# Patient Record
Sex: MALE | Race: WHITE | NOT HISPANIC OR LATINO | Employment: OTHER | URBAN - METROPOLITAN AREA
[De-identification: names, ages, dates, MRNs, and addresses within clinical notes are randomized per-mention and may not be internally consistent; named-entity substitution may affect disease eponyms.]

---

## 2023-02-16 PROBLEM — R26.9 GAIT DISTURBANCE: Status: ACTIVE | Noted: 2023-02-16

## 2023-02-16 PROBLEM — G70.00 MYASTHENIA GRAVIS (MULTI): Status: ACTIVE | Noted: 2023-02-16

## 2023-02-16 PROBLEM — R93.5 ABNORMAL CT OF THE ABDOMEN: Status: ACTIVE | Noted: 2023-02-16

## 2023-02-16 PROBLEM — I10 HTN (HYPERTENSION): Status: ACTIVE | Noted: 2023-02-16

## 2023-02-16 PROBLEM — Z98.61 CAD S/P PERCUTANEOUS CORONARY ANGIOPLASTY: Status: ACTIVE | Noted: 2023-02-16

## 2023-02-16 PROBLEM — R10.13 ABDOMINAL PAIN, EPIGASTRIC: Status: ACTIVE | Noted: 2023-02-16

## 2023-02-16 PROBLEM — N50.819 PERSISTENT TESTICULAR PAIN: Status: ACTIVE | Noted: 2023-02-16

## 2023-02-16 PROBLEM — E61.1 LOW IRON: Status: ACTIVE | Noted: 2023-02-16

## 2023-02-16 PROBLEM — I25.2 HISTORY OF ST ELEVATION MYOCARDIAL INFARCTION (STEMI): Status: ACTIVE | Noted: 2023-02-16

## 2023-02-16 PROBLEM — B35.3 TINEA PEDIS, RECURRENT: Status: ACTIVE | Noted: 2023-02-16

## 2023-02-16 PROBLEM — K21.9 GERD (GASTROESOPHAGEAL REFLUX DISEASE): Status: ACTIVE | Noted: 2023-02-16

## 2023-02-16 PROBLEM — G43.909 MIGRAINE: Status: ACTIVE | Noted: 2023-02-16

## 2023-02-16 PROBLEM — D51.9 B12 DEFICIENCY ANEMIA: Status: ACTIVE | Noted: 2023-02-16

## 2023-02-16 PROBLEM — R42 VERTIGO: Status: ACTIVE | Noted: 2023-02-16

## 2023-02-16 PROBLEM — N20.0 CALCIUM NEPHROLITHIASIS: Status: ACTIVE | Noted: 2023-02-16

## 2023-02-16 PROBLEM — M54.2 CERVICALGIA: Status: ACTIVE | Noted: 2023-02-16

## 2023-02-16 PROBLEM — E78.5 HYPERLIPEMIA: Status: ACTIVE | Noted: 2023-02-16

## 2023-02-16 PROBLEM — B00.1 COLD SORE: Status: ACTIVE | Noted: 2023-02-16

## 2023-02-16 PROBLEM — L72.3 SEBACEOUS CYST: Status: ACTIVE | Noted: 2023-02-16

## 2023-02-16 PROBLEM — R59.9 SWOLLEN GLAND: Status: ACTIVE | Noted: 2023-02-16

## 2023-02-16 PROBLEM — I21.9 HEART ATTACK (MULTI): Status: ACTIVE | Noted: 2023-02-16

## 2023-02-16 PROBLEM — R53.83 FATIGUE: Status: ACTIVE | Noted: 2023-02-16

## 2023-02-16 PROBLEM — B07.0 BILATERAL PLANTAR WART: Status: ACTIVE | Noted: 2023-02-16

## 2023-02-16 PROBLEM — N40.0 BPH (BENIGN PROSTATIC HYPERPLASIA): Status: ACTIVE | Noted: 2023-02-16

## 2023-02-16 PROBLEM — I25.10 CAD S/P PERCUTANEOUS CORONARY ANGIOPLASTY: Status: ACTIVE | Noted: 2023-02-16

## 2023-02-16 PROBLEM — I48.91 ATRIAL FIBRILLATION (MULTI): Status: ACTIVE | Noted: 2023-02-16

## 2023-02-16 RX ORDER — AZATHIOPRINE 50 MG/1
2 TABLET ORAL 2 TIMES DAILY
COMMUNITY
Start: 2019-09-10

## 2023-02-16 RX ORDER — KETOCONAZOLE 20 MG/G
1 CREAM TOPICAL 2 TIMES DAILY
COMMUNITY
Start: 2021-08-18

## 2023-02-16 RX ORDER — CHOLECALCIFEROL (VITAMIN D3) 50 MCG
1 TABLET ORAL DAILY
COMMUNITY
Start: 2019-09-10

## 2023-03-29 ENCOUNTER — OFFICE VISIT (OUTPATIENT)
Dept: PRIMARY CARE | Facility: CLINIC | Age: 78
End: 2023-03-29
Payer: MEDICARE

## 2023-03-29 ENCOUNTER — APPOINTMENT (OUTPATIENT)
Dept: PRIMARY CARE | Facility: CLINIC | Age: 78
End: 2023-03-29
Payer: MEDICARE

## 2023-03-29 VITALS
BODY MASS INDEX: 25.77 KG/M2 | OXYGEN SATURATION: 97 % | WEIGHT: 180 LBS | HEIGHT: 70 IN | TEMPERATURE: 97.5 F | HEART RATE: 62 BPM | RESPIRATION RATE: 14 BRPM | DIASTOLIC BLOOD PRESSURE: 70 MMHG | SYSTOLIC BLOOD PRESSURE: 120 MMHG

## 2023-03-29 DIAGNOSIS — Z98.61 CAD S/P PERCUTANEOUS CORONARY ANGIOPLASTY: ICD-10-CM

## 2023-03-29 DIAGNOSIS — I25.10 CAD S/P PERCUTANEOUS CORONARY ANGIOPLASTY: ICD-10-CM

## 2023-03-29 DIAGNOSIS — G70.00 MYASTHENIA GRAVIS (MULTI): ICD-10-CM

## 2023-03-29 DIAGNOSIS — I10 PRIMARY HYPERTENSION: ICD-10-CM

## 2023-03-29 DIAGNOSIS — E78.5 HYPERLIPIDEMIA, UNSPECIFIED HYPERLIPIDEMIA TYPE: ICD-10-CM

## 2023-03-29 DIAGNOSIS — D51.8 OTHER VITAMIN B12 DEFICIENCY ANEMIA: Primary | ICD-10-CM

## 2023-03-29 DIAGNOSIS — I48.0 PAROXYSMAL ATRIAL FIBRILLATION (MULTI): ICD-10-CM

## 2023-03-29 PROBLEM — R42 VERTIGO: Status: RESOLVED | Noted: 2023-02-16 | Resolved: 2023-03-29

## 2023-03-29 PROBLEM — R59.9 SWOLLEN GLAND: Status: RESOLVED | Noted: 2023-02-16 | Resolved: 2023-03-29

## 2023-03-29 PROBLEM — B00.1 COLD SORE: Status: RESOLVED | Noted: 2023-02-16 | Resolved: 2023-03-29

## 2023-03-29 PROBLEM — L72.3 SEBACEOUS CYST: Status: RESOLVED | Noted: 2023-02-16 | Resolved: 2023-03-29

## 2023-03-29 PROCEDURE — 1160F RVW MEDS BY RX/DR IN RCRD: CPT | Performed by: INTERNAL MEDICINE

## 2023-03-29 PROCEDURE — 99213 OFFICE O/P EST LOW 20 MIN: CPT | Performed by: INTERNAL MEDICINE

## 2023-03-29 PROCEDURE — 96372 THER/PROPH/DIAG INJ SC/IM: CPT | Performed by: INTERNAL MEDICINE

## 2023-03-29 PROCEDURE — 3078F DIAST BP <80 MM HG: CPT | Performed by: INTERNAL MEDICINE

## 2023-03-29 PROCEDURE — 1036F TOBACCO NON-USER: CPT | Performed by: INTERNAL MEDICINE

## 2023-03-29 PROCEDURE — 1157F ADVNC CARE PLAN IN RCRD: CPT | Performed by: INTERNAL MEDICINE

## 2023-03-29 PROCEDURE — 1159F MED LIST DOCD IN RCRD: CPT | Performed by: INTERNAL MEDICINE

## 2023-03-29 PROCEDURE — 3074F SYST BP LT 130 MM HG: CPT | Performed by: INTERNAL MEDICINE

## 2023-03-29 RX ORDER — PYRIDOSTIGMINE BROMIDE 60 MG/1
60 TABLET ORAL 3 TIMES DAILY
COMMUNITY
End: 2024-04-09 | Stop reason: ALTCHOICE

## 2023-03-29 RX ORDER — CYANOCOBALAMIN 1000 UG/ML
1000 INJECTION, SOLUTION INTRAMUSCULAR; SUBCUTANEOUS ONCE
Status: COMPLETED | OUTPATIENT
Start: 2023-03-29 | End: 2023-03-29

## 2023-03-29 RX ADMIN — CYANOCOBALAMIN 1000 MCG: 1000 INJECTION, SOLUTION INTRAMUSCULAR; SUBCUTANEOUS at 16:57

## 2023-03-29 ASSESSMENT — ENCOUNTER SYMPTOMS
COUGH: 0
PALPITATIONS: 0
WHEEZING: 0
SHORTNESS OF BREATH: 0
RHINORRHEA: 1
ABDOMINAL PAIN: 0
CONSTIPATION: 0

## 2023-03-29 NOTE — PROGRESS NOTES
"Subjective   Patient ID: David Caldwell is a 77 y.o. male who presents for Hyperlipidemia (Follow up  and  left eye changes .).  He has been on contact with his Myasthenia gravis doctor and his medications have been increased/changed.    Other than the Myasthenia symptoms he has been feeling well.  He continues to work out regularly.  He denies any issues with CP, SOB or dizzy spells.    Hyperlipidemia  Pertinent negatives include no shortness of breath.        Review of Systems   HENT:  Positive for rhinorrhea.    Respiratory:  Negative for cough, shortness of breath and wheezing.    Cardiovascular:  Negative for palpitations.   Gastrointestinal:  Negative for abdominal pain and constipation.       Objective   /70 (BP Location: Left arm, Patient Position: Sitting, BP Cuff Size: Adult)   Pulse 62   Temp 36.4 °C (97.5 °F) (Tympanic)   Resp 14   Ht 1.778 m (5' 10\")   Wt 81.6 kg (180 lb)   SpO2 97%   BMI 25.83 kg/m²     Physical Exam  Constitutional:       General: He is not in acute distress.     Appearance: Normal appearance. He is not ill-appearing.   HENT:      Head: Normocephalic and atraumatic.      Nose: Nose normal.   Eyes:      Extraocular Movements: Extraocular movements intact.      Conjunctiva/sclera: Conjunctivae normal.      Pupils: Pupils are equal, round, and reactive to light.   Cardiovascular:      Rate and Rhythm: Normal rate and regular rhythm.      Pulses: Normal pulses.      Heart sounds: Normal heart sounds.   Pulmonary:      Effort: Pulmonary effort is normal.      Breath sounds: Normal breath sounds.   Abdominal:      General: There is no distension.   Musculoskeletal:         General: Normal range of motion.      Cervical back: Neck supple.   Neurological:      General: No focal deficit present.      Mental Status: He is alert.      Gait: Gait normal.   Psychiatric:         Mood and Affect: Mood normal.         Behavior: Behavior normal.         Assessment/Plan   Problem List " Items Addressed This Visit          Nervous    Myasthenia gravis (CMS/HCC)       Circulatory    Atrial fibrillation (CMS/HCC)    Relevant Orders    CBC and Auto Differential    Thyroid Stimulating Hormone    CAD S/P percutaneous coronary angioplasty    Relevant Orders    Lipid Panel    HTN (hypertension)    Relevant Orders    Comprehensive Metabolic Panel       Endocrine/Metabolic    B12 deficiency anemia - Primary    Relevant Medications    cyanocobalamin (Vitamin B-12) injection 1,000 mcg (Completed)       Other    Hyperlipemia   Cardiac status and risk factor seem to be well controlled.  We will check cholesterol level and metabolic risk.  Currently no changes in medications are needed.    Due to his increase in his MG medications we will check his blood counts as well.    Follow up in 6 months - sooner if any issues.

## 2023-04-11 ENCOUNTER — LAB (OUTPATIENT)
Dept: LAB | Facility: LAB | Age: 78
End: 2023-04-11
Payer: MEDICARE

## 2023-04-11 DIAGNOSIS — I25.10 CAD S/P PERCUTANEOUS CORONARY ANGIOPLASTY: ICD-10-CM

## 2023-04-11 DIAGNOSIS — Z98.61 CAD S/P PERCUTANEOUS CORONARY ANGIOPLASTY: ICD-10-CM

## 2023-04-11 DIAGNOSIS — I10 PRIMARY HYPERTENSION: ICD-10-CM

## 2023-04-11 DIAGNOSIS — I48.0 PAROXYSMAL ATRIAL FIBRILLATION (MULTI): ICD-10-CM

## 2023-04-11 LAB
ALANINE AMINOTRANSFERASE (SGPT) (U/L) IN SER/PLAS: 9 U/L (ref 10–52)
ALBUMIN (G/DL) IN SER/PLAS: 4.1 G/DL (ref 3.4–5)
ALKALINE PHOSPHATASE (U/L) IN SER/PLAS: 66 U/L (ref 33–136)
ANION GAP IN SER/PLAS: 10 MMOL/L (ref 10–20)
ASPARTATE AMINOTRANSFERASE (SGOT) (U/L) IN SER/PLAS: 14 U/L (ref 9–39)
BASOPHILS (10*3/UL) IN BLOOD BY AUTOMATED COUNT: 0.04 X10E9/L (ref 0–0.1)
BASOPHILS/100 LEUKOCYTES IN BLOOD BY AUTOMATED COUNT: 0.9 % (ref 0–2)
BILIRUBIN TOTAL (MG/DL) IN SER/PLAS: 1 MG/DL (ref 0–1.2)
CALCIUM (MG/DL) IN SER/PLAS: 9.2 MG/DL (ref 8.6–10.3)
CARBON DIOXIDE, TOTAL (MMOL/L) IN SER/PLAS: 29 MMOL/L (ref 21–32)
CHLORIDE (MMOL/L) IN SER/PLAS: 106 MMOL/L (ref 98–107)
CHOLESTEROL (MG/DL) IN SER/PLAS: 173 MG/DL (ref 0–199)
CHOLESTEROL IN HDL (MG/DL) IN SER/PLAS: 42.4 MG/DL
CHOLESTEROL/HDL RATIO: 4.1
CREATININE (MG/DL) IN SER/PLAS: 1.05 MG/DL (ref 0.5–1.3)
EOSINOPHILS (10*3/UL) IN BLOOD BY AUTOMATED COUNT: 0.1 X10E9/L (ref 0–0.4)
EOSINOPHILS/100 LEUKOCYTES IN BLOOD BY AUTOMATED COUNT: 2.2 % (ref 0–6)
ERYTHROCYTE DISTRIBUTION WIDTH (RATIO) BY AUTOMATED COUNT: 16.2 % (ref 11.5–14.5)
ERYTHROCYTE MEAN CORPUSCULAR HEMOGLOBIN CONCENTRATION (G/DL) BY AUTOMATED: 30 G/DL (ref 32–36)
ERYTHROCYTE MEAN CORPUSCULAR VOLUME (FL) BY AUTOMATED COUNT: 81 FL (ref 80–100)
ERYTHROCYTES (10*6/UL) IN BLOOD BY AUTOMATED COUNT: 4.59 X10E12/L (ref 4.5–5.9)
GFR MALE: 72 ML/MIN/1.73M2
GLUCOSE (MG/DL) IN SER/PLAS: 91 MG/DL (ref 74–99)
HEMATOCRIT (%) IN BLOOD BY AUTOMATED COUNT: 37.3 % (ref 41–52)
HEMOGLOBIN (G/DL) IN BLOOD: 11.2 G/DL (ref 13.5–17.5)
IMMATURE GRANULOCYTES/100 LEUKOCYTES IN BLOOD BY AUTOMATED COUNT: 0.2 % (ref 0–0.9)
LDL: 118 MG/DL (ref 0–99)
LEUKOCYTES (10*3/UL) IN BLOOD BY AUTOMATED COUNT: 4.6 X10E9/L (ref 4.4–11.3)
LYMPHOCYTES (10*3/UL) IN BLOOD BY AUTOMATED COUNT: 0.84 X10E9/L (ref 0.8–3)
LYMPHOCYTES/100 LEUKOCYTES IN BLOOD BY AUTOMATED COUNT: 18.2 % (ref 13–44)
MONOCYTES (10*3/UL) IN BLOOD BY AUTOMATED COUNT: 0.44 X10E9/L (ref 0.05–0.8)
MONOCYTES/100 LEUKOCYTES IN BLOOD BY AUTOMATED COUNT: 9.5 % (ref 2–10)
NEUTROPHILS (10*3/UL) IN BLOOD BY AUTOMATED COUNT: 3.18 X10E9/L (ref 1.6–5.5)
NEUTROPHILS/100 LEUKOCYTES IN BLOOD BY AUTOMATED COUNT: 69 % (ref 40–80)
PLATELETS (10*3/UL) IN BLOOD AUTOMATED COUNT: 190 X10E9/L (ref 150–450)
POTASSIUM (MMOL/L) IN SER/PLAS: 4.7 MMOL/L (ref 3.5–5.3)
PROTEIN TOTAL: 7.3 G/DL (ref 6.4–8.2)
SODIUM (MMOL/L) IN SER/PLAS: 140 MMOL/L (ref 136–145)
THYROTROPIN (MIU/L) IN SER/PLAS BY DETECTION LIMIT <= 0.05 MIU/L: 2.7 MIU/L (ref 0.44–3.98)
TRIGLYCERIDE (MG/DL) IN SER/PLAS: 64 MG/DL (ref 0–149)
UREA NITROGEN (MG/DL) IN SER/PLAS: 20 MG/DL (ref 6–23)
VLDL: 13 MG/DL (ref 0–40)

## 2023-04-11 PROCEDURE — 84443 ASSAY THYROID STIM HORMONE: CPT

## 2023-04-11 PROCEDURE — 80061 LIPID PANEL: CPT

## 2023-04-11 PROCEDURE — 36415 COLL VENOUS BLD VENIPUNCTURE: CPT

## 2023-04-11 PROCEDURE — 80053 COMPREHEN METABOLIC PANEL: CPT

## 2023-04-11 PROCEDURE — 85025 COMPLETE CBC W/AUTO DIFF WBC: CPT

## 2023-04-17 ENCOUNTER — TELEPHONE (OUTPATIENT)
Dept: PRIMARY CARE | Facility: CLINIC | Age: 78
End: 2023-04-17
Payer: MEDICARE

## 2023-05-31 ENCOUNTER — CLINICAL SUPPORT (OUTPATIENT)
Dept: PRIMARY CARE | Facility: CLINIC | Age: 78
End: 2023-05-31
Payer: MEDICARE

## 2023-05-31 ENCOUNTER — TELEPHONE (OUTPATIENT)
Dept: PRIMARY CARE | Facility: CLINIC | Age: 78
End: 2023-05-31

## 2023-05-31 DIAGNOSIS — N52.9 ERECTILE DYSFUNCTION, UNSPECIFIED ERECTILE DYSFUNCTION TYPE: Primary | ICD-10-CM

## 2023-05-31 DIAGNOSIS — D51.8 OTHER VITAMIN B12 DEFICIENCY ANEMIA: Primary | ICD-10-CM

## 2023-05-31 PROCEDURE — 96372 THER/PROPH/DIAG INJ SC/IM: CPT | Performed by: INTERNAL MEDICINE

## 2023-05-31 RX ORDER — CYANOCOBALAMIN 1000 UG/ML
1000 INJECTION, SOLUTION INTRAMUSCULAR; SUBCUTANEOUS ONCE
Status: COMPLETED | OUTPATIENT
Start: 2023-05-31 | End: 2023-05-31

## 2023-05-31 RX ORDER — SILDENAFIL 50 MG/1
50 TABLET, FILM COATED ORAL DAILY PRN
Qty: 12 TABLET | Refills: 3 | Status: SHIPPED | OUTPATIENT
Start: 2023-05-31 | End: 2023-11-21 | Stop reason: ALTCHOICE

## 2023-05-31 RX ADMIN — CYANOCOBALAMIN 1000 MCG: 1000 INJECTION, SOLUTION INTRAMUSCULAR; SUBCUTANEOUS at 11:45

## 2023-09-27 ENCOUNTER — OFFICE VISIT (OUTPATIENT)
Dept: PRIMARY CARE | Facility: CLINIC | Age: 78
End: 2023-09-27
Payer: MEDICARE

## 2023-09-27 VITALS
SYSTOLIC BLOOD PRESSURE: 130 MMHG | TEMPERATURE: 98 F | OXYGEN SATURATION: 98 % | RESPIRATION RATE: 14 BRPM | DIASTOLIC BLOOD PRESSURE: 80 MMHG | WEIGHT: 185 LBS | HEART RATE: 80 BPM | BODY MASS INDEX: 26.48 KG/M2 | HEIGHT: 70 IN

## 2023-09-27 DIAGNOSIS — G70.00 MYASTHENIA GRAVIS (MULTI): ICD-10-CM

## 2023-09-27 DIAGNOSIS — N40.1 BENIGN PROSTATIC HYPERPLASIA WITH URINARY FREQUENCY: ICD-10-CM

## 2023-09-27 DIAGNOSIS — I10 PRIMARY HYPERTENSION: ICD-10-CM

## 2023-09-27 DIAGNOSIS — E78.5 HYPERLIPIDEMIA, UNSPECIFIED HYPERLIPIDEMIA TYPE: ICD-10-CM

## 2023-09-27 DIAGNOSIS — Z12.5 SCREENING FOR PROSTATE CANCER: ICD-10-CM

## 2023-09-27 DIAGNOSIS — E53.8 LOW SERUM VITAMIN B12: Primary | ICD-10-CM

## 2023-09-27 DIAGNOSIS — Z79.899 HIGH RISK MEDICATION USE: ICD-10-CM

## 2023-09-27 DIAGNOSIS — R35.0 BENIGN PROSTATIC HYPERPLASIA WITH URINARY FREQUENCY: ICD-10-CM

## 2023-09-27 PROBLEM — D51.3 OTHER DIETARY VITAMIN B12 DEFICIENCY ANEMIA: Status: ACTIVE | Noted: 2023-02-16

## 2023-09-27 PROCEDURE — 1170F FXNL STATUS ASSESSED: CPT | Performed by: INTERNAL MEDICINE

## 2023-09-27 PROCEDURE — 1159F MED LIST DOCD IN RCRD: CPT | Performed by: INTERNAL MEDICINE

## 2023-09-27 PROCEDURE — G0439 PPPS, SUBSEQ VISIT: HCPCS | Performed by: INTERNAL MEDICINE

## 2023-09-27 PROCEDURE — 3079F DIAST BP 80-89 MM HG: CPT | Performed by: INTERNAL MEDICINE

## 2023-09-27 PROCEDURE — 99214 OFFICE O/P EST MOD 30 MIN: CPT | Performed by: INTERNAL MEDICINE

## 2023-09-27 PROCEDURE — 1036F TOBACCO NON-USER: CPT | Performed by: INTERNAL MEDICINE

## 2023-09-27 PROCEDURE — 1160F RVW MEDS BY RX/DR IN RCRD: CPT | Performed by: INTERNAL MEDICINE

## 2023-09-27 PROCEDURE — 3075F SYST BP GE 130 - 139MM HG: CPT | Performed by: INTERNAL MEDICINE

## 2023-09-27 PROCEDURE — 96372 THER/PROPH/DIAG INJ SC/IM: CPT | Performed by: INTERNAL MEDICINE

## 2023-09-27 RX ORDER — CYANOCOBALAMIN 1000 UG/ML
1000 INJECTION, SOLUTION INTRAMUSCULAR; SUBCUTANEOUS ONCE
Status: COMPLETED | OUTPATIENT
Start: 2023-09-27 | End: 2023-09-27

## 2023-09-27 RX ORDER — TAMSULOSIN HYDROCHLORIDE 0.4 MG/1
0.4 CAPSULE ORAL DAILY
Qty: 30 CAPSULE | Refills: 11 | Status: SHIPPED | OUTPATIENT
Start: 2023-09-27 | End: 2024-04-09 | Stop reason: WASHOUT

## 2023-09-27 RX ADMIN — CYANOCOBALAMIN 1000 MCG: 1000 INJECTION, SOLUTION INTRAMUSCULAR; SUBCUTANEOUS at 13:27

## 2023-09-27 ASSESSMENT — ACTIVITIES OF DAILY LIVING (ADL)
GROCERY_SHOPPING: INDEPENDENT
TAKING_MEDICATION: INDEPENDENT
DOING_HOUSEWORK: INDEPENDENT
DRESSING: INDEPENDENT
MANAGING_FINANCES: INDEPENDENT
BATHING: INDEPENDENT

## 2023-09-27 ASSESSMENT — ENCOUNTER SYMPTOMS
CHOKING: 0
ABDOMINAL PAIN: 0
DIARRHEA: 0
NAUSEA: 0
SHORTNESS OF BREATH: 0
PALPITATIONS: 0
COUGH: 0
CONSTIPATION: 0
WHEEZING: 0

## 2023-09-27 ASSESSMENT — PATIENT HEALTH QUESTIONNAIRE - PHQ9
2. FEELING DOWN, DEPRESSED OR HOPELESS: NOT AT ALL
1. LITTLE INTEREST OR PLEASURE IN DOING THINGS: NOT AT ALL
SUM OF ALL RESPONSES TO PHQ9 QUESTIONS 1 AND 2: 0

## 2023-09-27 NOTE — PROGRESS NOTES
"Subjective   Patient ID: David Caldwell is a 78 y.o. male who presents for follow up and Medicare Annual Wellness Visit Subsequent.    Had a flare of his Myasthenia Gravis.  His Imuran has been doubled.  Since then he has had sinus congestion.  Yellow mucus.  No F/C/S.  Slight cough.  No SOB.    He also complains of waking up 4-5 times/night to urinate.  No dysuria.  No F/C/S.      Review of Systems   Respiratory:  Negative for cough, choking, shortness of breath and wheezing.    Cardiovascular:  Negative for chest pain and palpitations.   Gastrointestinal:  Negative for abdominal pain, constipation, diarrhea and nausea.       Objective   /80 (BP Location: Left arm, Patient Position: Sitting, BP Cuff Size: Adult)   Pulse 80   Temp 36.7 °C (98 °F) (Tympanic)   Resp 14   Ht 1.778 m (5' 10\")   Wt 83.9 kg (185 lb)   SpO2 98%   BMI 26.54 kg/m²     Physical Exam  Vitals reviewed.   Constitutional:       Appearance: Normal appearance.   HENT:      Head: Normocephalic.   Cardiovascular:      Rate and Rhythm: Normal rate and regular rhythm.   Pulmonary:      Effort: Pulmonary effort is normal.      Breath sounds: Normal breath sounds.   Musculoskeletal:         General: Normal range of motion.   Neurological:      General: No focal deficit present.      Mental Status: He is alert.   Psychiatric:         Mood and Affect: Mood normal.       Assessment/Plan   Problem List Items Addressed This Visit             ICD-10-CM    BPH (benign prostatic hyperplasia) N40.0    Relevant Medications    tamsulosin (Flomax) 0.4 mg 24 hr capsule    Primary hypertension I10    Relevant Orders    Comprehensive Metabolic Panel    Hyperlipemia E78.5    Myasthenia gravis (CMS/HCC) G70.00     Other Visit Diagnoses         Codes    Low serum vitamin B12    -  Primary E53.8    Relevant Medications    cyanocobalamin (Vitamin B-12) injection 1,000 mcg (Completed)    Other Relevant Orders    CBC    High risk medication use     Z79.899    " Relevant Orders    CBC    Comprehensive Metabolic Panel    Screening for prostate cancer     Z12.5    Relevant Orders    Prostate Specific Antigen        We discussed all of the above  No apparent interaction between imuran and flomax.  We will send this to the pharmacy.  He is also requesting a PSA.    Due to his increase in Imuran dose we will check LFTs and blood counts.    HTN is currently controlled without medications.  We will continue to monitor.  He was previously on medication, but has been ok recently.    Annual Wellness Visit questions and answers were reviewed and discussed including the importance of discussing end of life wishes as well as having a living will and health care power of .

## 2023-10-05 ENCOUNTER — LAB (OUTPATIENT)
Dept: LAB | Facility: LAB | Age: 78
End: 2023-10-05
Payer: MEDICARE

## 2023-10-05 DIAGNOSIS — E53.8 LOW SERUM VITAMIN B12: ICD-10-CM

## 2023-10-05 DIAGNOSIS — Z79.899 HIGH RISK MEDICATION USE: ICD-10-CM

## 2023-10-05 DIAGNOSIS — I10 PRIMARY HYPERTENSION: ICD-10-CM

## 2023-10-05 DIAGNOSIS — Z12.5 SCREENING FOR PROSTATE CANCER: ICD-10-CM

## 2023-10-05 LAB
ALBUMIN SERPL BCP-MCNC: 3.8 G/DL (ref 3.4–5)
ALP SERPL-CCNC: 65 U/L (ref 33–136)
ALT SERPL W P-5'-P-CCNC: 11 U/L (ref 10–52)
ANION GAP SERPL CALC-SCNC: 9 MMOL/L (ref 10–20)
AST SERPL W P-5'-P-CCNC: 19 U/L (ref 9–39)
BILIRUB SERPL-MCNC: 1 MG/DL (ref 0–1.2)
BUN SERPL-MCNC: 19 MG/DL (ref 6–23)
CALCIUM SERPL-MCNC: 9.3 MG/DL (ref 8.6–10.3)
CHLORIDE SERPL-SCNC: 105 MMOL/L (ref 98–107)
CO2 SERPL-SCNC: 28 MMOL/L (ref 21–32)
CREAT SERPL-MCNC: 1.07 MG/DL (ref 0.5–1.3)
ERYTHROCYTE [DISTWIDTH] IN BLOOD BY AUTOMATED COUNT: 16.9 % (ref 11.5–14.5)
GFR SERPL CREATININE-BSD FRML MDRD: 71 ML/MIN/1.73M*2
GLUCOSE SERPL-MCNC: 103 MG/DL (ref 74–99)
HCT VFR BLD AUTO: 36.4 % (ref 41–52)
HGB BLD-MCNC: 10.7 G/DL (ref 13.5–17.5)
MCH RBC QN AUTO: 24 PG (ref 26–34)
MCHC RBC AUTO-ENTMCNC: 29.4 G/DL (ref 32–36)
MCV RBC AUTO: 82 FL (ref 80–100)
NRBC BLD-RTO: 0 /100 WBCS (ref 0–0)
PLATELET # BLD AUTO: 235 X10*3/UL (ref 150–450)
PMV BLD AUTO: 9.9 FL (ref 7.5–11.5)
POTASSIUM SERPL-SCNC: 4.4 MMOL/L (ref 3.5–5.3)
PROT SERPL-MCNC: 6.7 G/DL (ref 6.4–8.2)
PSA SERPL-MCNC: 0.98 NG/ML
RBC # BLD AUTO: 4.45 X10*6/UL (ref 4.5–5.9)
SODIUM SERPL-SCNC: 138 MMOL/L (ref 136–145)
WBC # BLD AUTO: 4.4 X10*3/UL (ref 4.4–11.3)

## 2023-10-05 PROCEDURE — 85027 COMPLETE CBC AUTOMATED: CPT

## 2023-10-05 PROCEDURE — 36415 COLL VENOUS BLD VENIPUNCTURE: CPT

## 2023-10-05 PROCEDURE — G0103 PSA SCREENING: HCPCS

## 2023-10-05 PROCEDURE — 80053 COMPREHEN METABOLIC PANEL: CPT

## 2023-10-09 ENCOUNTER — TELEPHONE (OUTPATIENT)
Dept: PRIMARY CARE | Facility: CLINIC | Age: 78
End: 2023-10-09
Payer: MEDICARE

## 2023-11-15 ENCOUNTER — CLINICAL SUPPORT (OUTPATIENT)
Dept: PRIMARY CARE | Facility: CLINIC | Age: 78
End: 2023-11-15
Payer: MEDICARE

## 2023-11-15 DIAGNOSIS — E53.8 LOW SERUM VITAMIN B12: Primary | ICD-10-CM

## 2023-11-15 PROCEDURE — 96372 THER/PROPH/DIAG INJ SC/IM: CPT | Performed by: INTERNAL MEDICINE

## 2023-11-15 RX ORDER — CYANOCOBALAMIN 1000 UG/ML
1000 INJECTION, SOLUTION INTRAMUSCULAR; SUBCUTANEOUS ONCE
Status: COMPLETED | OUTPATIENT
Start: 2023-11-15 | End: 2023-11-15

## 2023-11-15 RX ADMIN — CYANOCOBALAMIN 1000 MCG: 1000 INJECTION, SOLUTION INTRAMUSCULAR; SUBCUTANEOUS at 16:47

## 2023-11-21 ENCOUNTER — ANCILLARY PROCEDURE (OUTPATIENT)
Dept: RADIOLOGY | Facility: CLINIC | Age: 78
End: 2023-11-21
Payer: MEDICARE

## 2023-11-21 ENCOUNTER — OFFICE VISIT (OUTPATIENT)
Dept: PRIMARY CARE | Facility: CLINIC | Age: 78
End: 2023-11-21
Payer: MEDICARE

## 2023-11-21 VITALS
DIASTOLIC BLOOD PRESSURE: 86 MMHG | RESPIRATION RATE: 14 BRPM | HEIGHT: 70 IN | OXYGEN SATURATION: 96 % | TEMPERATURE: 97 F | BODY MASS INDEX: 26.2 KG/M2 | HEART RATE: 65 BPM | SYSTOLIC BLOOD PRESSURE: 136 MMHG | WEIGHT: 183 LBS

## 2023-11-21 DIAGNOSIS — N52.9 ERECTILE DYSFUNCTION, UNSPECIFIED ERECTILE DYSFUNCTION TYPE: ICD-10-CM

## 2023-11-21 DIAGNOSIS — M25.551 RIGHT HIP PAIN: Primary | ICD-10-CM

## 2023-11-21 DIAGNOSIS — M25.551 RIGHT HIP PAIN: ICD-10-CM

## 2023-11-21 PROCEDURE — 73502 X-RAY EXAM HIP UNI 2-3 VIEWS: CPT | Mod: RT,FY

## 2023-11-21 PROCEDURE — 3079F DIAST BP 80-89 MM HG: CPT | Performed by: INTERNAL MEDICINE

## 2023-11-21 PROCEDURE — 1036F TOBACCO NON-USER: CPT | Performed by: INTERNAL MEDICINE

## 2023-11-21 PROCEDURE — 99213 OFFICE O/P EST LOW 20 MIN: CPT | Performed by: INTERNAL MEDICINE

## 2023-11-21 PROCEDURE — 3075F SYST BP GE 130 - 139MM HG: CPT | Performed by: INTERNAL MEDICINE

## 2023-11-21 PROCEDURE — 73502 X-RAY EXAM HIP UNI 2-3 VIEWS: CPT | Mod: RIGHT SIDE | Performed by: RADIOLOGY

## 2023-11-21 PROCEDURE — 1159F MED LIST DOCD IN RCRD: CPT | Performed by: INTERNAL MEDICINE

## 2023-11-21 PROCEDURE — 1160F RVW MEDS BY RX/DR IN RCRD: CPT | Performed by: INTERNAL MEDICINE

## 2023-11-21 RX ORDER — TADALAFIL 10 MG/1
10 TABLET ORAL DAILY PRN
Qty: 12 TABLET | Refills: 3 | Status: SHIPPED | OUTPATIENT
Start: 2023-11-21 | End: 2024-11-20

## 2023-11-21 ASSESSMENT — ENCOUNTER SYMPTOMS
WHEEZING: 0
ABDOMINAL PAIN: 1
PALPITATIONS: 0
COUGH: 0
ROS GI COMMENTS: GROIN PAIN
SHORTNESS OF BREATH: 0

## 2023-11-21 NOTE — PROGRESS NOTES
"Subjective   Patient ID: David Caldwell is a 78 y.o. male who presents for Groin Pain (X 2 months ).    Groin Pain  Associated symptoms include abdominal pain. Pertinent negatives include no chest pain, coughing or shortness of breath.   Able to walk/bear weight without pain or difficulty.  He states it is painful and stiff after sitting for awhile or when first getting up in the morning.    No numbness/tingling or weakness.      Review of Systems   Respiratory:  Negative for cough, shortness of breath and wheezing.    Cardiovascular:  Negative for chest pain and palpitations.   Gastrointestinal:  Positive for abdominal pain.        Groin pain       Objective   /86 (BP Location: Left arm, Patient Position: Sitting, BP Cuff Size: Adult)   Pulse 65   Temp 36.1 °C (97 °F) (Tympanic)   Resp 14   Ht 1.778 m (5' 10\")   Wt 83 kg (183 lb)   SpO2 96%   BMI 26.26 kg/m²     Physical Exam  Vitals reviewed.   Constitutional:       Appearance: Normal appearance.   HENT:      Head: Normocephalic.   Cardiovascular:      Rate and Rhythm: Normal rate.   Pulmonary:      Effort: Pulmonary effort is normal.   Musculoskeletal:         General: Normal range of motion.   Neurological:      General: No focal deficit present.      Mental Status: He is alert.   Psychiatric:         Mood and Affect: Mood normal.         Assessment/Plan   Problem List Items Addressed This Visit    None  Visit Diagnoses         Codes    Right hip pain    -  Primary M25.551    Relevant Orders    XR hip right 2 or 3 views (Completed)    Erectile dysfunction, unspecified erectile dysfunction type     N52.9    Relevant Medications    tadalafil (Cialis) 10 mg tablet        Hip pain that is consistent with OA.  We discussed the above.  As well as tylenol and/or motrin (with pepcid) as needed.  We will get an xray to evaluate severity.    Follow up in 6 months - sooner if any issues.         "

## 2023-12-01 ENCOUNTER — PATIENT OUTREACH (OUTPATIENT)
Dept: CARE COORDINATION | Facility: CLINIC | Age: 78
End: 2023-12-01
Payer: MEDICARE

## 2023-12-01 NOTE — PROGRESS NOTES
Discharge Facility: [facility]  Discharge Diagnosis: TIA  Admission Date: 11/27/2023  Discharge Date: 11/29/2023    PCP Appointment Date:  -12/6/2023 1100    Hospital Encounter and Summary: Linked    Unable to reach patient x2 attempts, voicemail left with call back number.

## 2023-12-06 ENCOUNTER — OFFICE VISIT (OUTPATIENT)
Dept: PRIMARY CARE | Facility: CLINIC | Age: 78
End: 2023-12-06
Payer: MEDICARE

## 2023-12-06 ENCOUNTER — TELEPHONE (OUTPATIENT)
Dept: PRIMARY CARE | Facility: CLINIC | Age: 78
End: 2023-12-06

## 2023-12-06 VITALS
OXYGEN SATURATION: 98 % | WEIGHT: 180 LBS | SYSTOLIC BLOOD PRESSURE: 134 MMHG | DIASTOLIC BLOOD PRESSURE: 84 MMHG | HEART RATE: 63 BPM | HEIGHT: 70 IN | TEMPERATURE: 98.7 F | BODY MASS INDEX: 25.77 KG/M2 | RESPIRATION RATE: 14 BRPM

## 2023-12-06 DIAGNOSIS — I63.9 CEREBROVASCULAR ACCIDENT (CVA), UNSPECIFIED MECHANISM (MULTI): ICD-10-CM

## 2023-12-06 DIAGNOSIS — Z79.01 ANTICOAGULATED: ICD-10-CM

## 2023-12-06 DIAGNOSIS — E78.00 PURE HYPERCHOLESTEROLEMIA: ICD-10-CM

## 2023-12-06 DIAGNOSIS — R73.9 ELEVATED BLOOD SUGAR: ICD-10-CM

## 2023-12-06 DIAGNOSIS — I10 PRIMARY HYPERTENSION: Primary | ICD-10-CM

## 2023-12-06 DIAGNOSIS — I48.0 PAROXYSMAL ATRIAL FIBRILLATION (MULTI): ICD-10-CM

## 2023-12-06 PROCEDURE — 99496 TRANSJ CARE MGMT HIGH F2F 7D: CPT | Performed by: INTERNAL MEDICINE

## 2023-12-06 PROCEDURE — 3079F DIAST BP 80-89 MM HG: CPT | Performed by: INTERNAL MEDICINE

## 2023-12-06 PROCEDURE — 1160F RVW MEDS BY RX/DR IN RCRD: CPT | Performed by: INTERNAL MEDICINE

## 2023-12-06 PROCEDURE — 1159F MED LIST DOCD IN RCRD: CPT | Performed by: INTERNAL MEDICINE

## 2023-12-06 PROCEDURE — 1036F TOBACCO NON-USER: CPT | Performed by: INTERNAL MEDICINE

## 2023-12-06 PROCEDURE — 3075F SYST BP GE 130 - 139MM HG: CPT | Performed by: INTERNAL MEDICINE

## 2023-12-06 RX ORDER — RIVAROXABAN 20 MG/1
20 TABLET, FILM COATED ORAL
COMMUNITY
Start: 2023-11-29

## 2023-12-06 RX ORDER — NAPROXEN SODIUM 220 MG/1
81 TABLET, FILM COATED ORAL DAILY
COMMUNITY
Start: 2023-11-29 | End: 2024-04-09 | Stop reason: ALTCHOICE

## 2023-12-06 RX ORDER — EZETIMIBE 10 MG/1
10 TABLET ORAL DAILY
COMMUNITY
Start: 2023-11-29 | End: 2024-04-09 | Stop reason: ALTCHOICE

## 2023-12-06 RX ORDER — LOSARTAN POTASSIUM 25 MG/1
25 TABLET ORAL DAILY
Qty: 90 TABLET | Refills: 3 | Status: SHIPPED | OUTPATIENT
Start: 2023-12-06 | End: 2024-04-05 | Stop reason: SINTOL

## 2023-12-06 RX ORDER — PANTOPRAZOLE SODIUM 40 MG/1
40 TABLET, DELAYED RELEASE ORAL DAILY
COMMUNITY
Start: 2023-11-29

## 2023-12-06 RX ORDER — ROSUVASTATIN CALCIUM 10 MG/1
10 TABLET, COATED ORAL DAILY
Qty: 90 TABLET | Refills: 3 | Status: SHIPPED | OUTPATIENT
Start: 2023-12-06 | End: 2024-04-09 | Stop reason: SINTOL

## 2023-12-06 ASSESSMENT — ENCOUNTER SYMPTOMS
SHORTNESS OF BREATH: 0
DIARRHEA: 0
CONSTIPATION: 0
WHEEZING: 0
COUGH: 0
NAUSEA: 0
PALPITATIONS: 0

## 2023-12-06 NOTE — PROGRESS NOTES
"Subjective   Patient ID: David Caldwell is a 78 y.o. male who presents for Hospital Follow-up ([facility] / TIA).    Admitted on 11/27 and discharged on 11/29.  Transition of care phone call made.  Today is 12/6 - within 1 week of discharge.    Had sudden onset of N/V.  Lasted about 20 minutes.  Both legs had heaviness to them after that.  He just felt off.  He went ot the ER and was admitted.    We reviewed and discussed hospital records.    Since being home he has felt fine.  No recurrence of symptoms.  No HA's, numbness, tingling or weakness.      Review of Systems   Respiratory:  Negative for cough, shortness of breath and wheezing.    Cardiovascular:  Negative for chest pain and palpitations.   Gastrointestinal:  Negative for constipation, diarrhea and nausea.   ROS is otherwise unremarkable.     Objective   /84 (BP Location: Left arm, Patient Position: Sitting, BP Cuff Size: Adult)   Pulse 63   Temp 37.1 °C (98.7 °F) (Tympanic)   Resp 14   Ht 1.778 m (5' 10\")   Wt 81.6 kg (180 lb)   SpO2 98%   BMI 25.83 kg/m²     Physical Exam  Vitals reviewed.   Constitutional:       Appearance: Normal appearance.   HENT:      Head: Normocephalic.   Cardiovascular:      Rate and Rhythm: Normal rate and regular rhythm.   Pulmonary:      Effort: Pulmonary effort is normal.      Breath sounds: Normal breath sounds.   Musculoskeletal:         General: Normal range of motion.   Neurological:      General: No focal deficit present.      Mental Status: He is alert.   Psychiatric:         Mood and Affect: Mood normal.       Assessment/Plan   Problem List Items Addressed This Visit             ICD-10-CM    Atrial fibrillation (CMS/HCC) I48.91    Relevant Orders    Thyroid Stimulating Hormone    Primary hypertension - Primary I10    Relevant Medications    losartan (Cozaar) 25 mg tablet    Pure hypercholesterolemia E78.00    Relevant Medications    rosuvastatin (Crestor) 10 mg tablet    Other Relevant Orders    Comprehensive " Metabolic Panel    Lipid Panel     Other Visit Diagnoses         Codes    Cerebrovascular accident (CVA), unspecified mechanism (CMS/[facility])     I63.9    Relevant Orders    Hemoglobin A1C    Anticoagulated     Z79.01    Relevant Orders    CBC    Elevated blood sugar     R73.9    Relevant Orders    Hemoglobin A1C        We reviewed and discussed all of the above.    MRI did show a small acute stroke.  No significant sequela.  We stressed need to address and treat risk factors - HTN, HLD and blood sugars.  He does not smoke, but he is exposed to second hand smoke.    Based on his BP's and lipids levels we will treat both for further risk factor reduction.    We discussed the benefits of low sugar and low carbohydrate diet (avoiding sugars, sweets, desserts, pop, juice, milk and minimizing foods such as breads, pasta/noodles, rice, cereal etc)   He is on Xarelto and tolerating this well.  No issues with bleeding or bruising.    Underlying myasthenia gravis.  Stable and controlled.    Follow up in 4 months - sooner if any issues.

## 2023-12-08 ENCOUNTER — PATIENT OUTREACH (OUTPATIENT)
Dept: CARE COORDINATION | Facility: CLINIC | Age: 78
End: 2023-12-08
Payer: MEDICARE

## 2023-12-18 ENCOUNTER — LAB (OUTPATIENT)
Dept: LAB | Facility: LAB | Age: 78
End: 2023-12-18
Payer: MEDICARE

## 2023-12-18 DIAGNOSIS — I63.9 CEREBROVASCULAR ACCIDENT (CVA), UNSPECIFIED MECHANISM (MULTI): ICD-10-CM

## 2023-12-18 DIAGNOSIS — R73.9 ELEVATED BLOOD SUGAR: ICD-10-CM

## 2023-12-18 DIAGNOSIS — I48.0 PAROXYSMAL ATRIAL FIBRILLATION (MULTI): ICD-10-CM

## 2023-12-18 DIAGNOSIS — E78.00 PURE HYPERCHOLESTEROLEMIA: ICD-10-CM

## 2023-12-18 DIAGNOSIS — Z79.01 ANTICOAGULATED: ICD-10-CM

## 2023-12-18 LAB
ALBUMIN SERPL BCP-MCNC: 3.8 G/DL (ref 3.4–5)
ALP SERPL-CCNC: 69 U/L (ref 33–136)
ALT SERPL W P-5'-P-CCNC: 6 U/L (ref 10–52)
ANION GAP SERPL CALC-SCNC: 11 MMOL/L (ref 10–20)
AST SERPL W P-5'-P-CCNC: 15 U/L (ref 9–39)
BILIRUB SERPL-MCNC: 1.1 MG/DL (ref 0–1.2)
BUN SERPL-MCNC: 17 MG/DL (ref 6–23)
CALCIUM SERPL-MCNC: 9.1 MG/DL (ref 8.6–10.3)
CHLORIDE SERPL-SCNC: 106 MMOL/L (ref 98–107)
CHOLEST SERPL-MCNC: 177 MG/DL (ref 0–199)
CHOLESTEROL/HDL RATIO: 4.7
CO2 SERPL-SCNC: 27 MMOL/L (ref 21–32)
CREAT SERPL-MCNC: 1.02 MG/DL (ref 0.5–1.3)
ERYTHROCYTE [DISTWIDTH] IN BLOOD BY AUTOMATED COUNT: 16.8 % (ref 11.5–14.5)
EST. AVERAGE GLUCOSE BLD GHB EST-MCNC: 105 MG/DL
GFR SERPL CREATININE-BSD FRML MDRD: 75 ML/MIN/1.73M*2
GLUCOSE SERPL-MCNC: 85 MG/DL (ref 74–99)
HBA1C MFR BLD: 5.3 %
HCT VFR BLD AUTO: 39 % (ref 41–52)
HDLC SERPL-MCNC: 37.3 MG/DL
HGB BLD-MCNC: 11.6 G/DL (ref 13.5–17.5)
LDLC SERPL CALC-MCNC: 125 MG/DL
MCH RBC QN AUTO: 25.2 PG (ref 26–34)
MCHC RBC AUTO-ENTMCNC: 29.7 G/DL (ref 32–36)
MCV RBC AUTO: 85 FL (ref 80–100)
NON HDL CHOLESTEROL: 140 MG/DL (ref 0–149)
NRBC BLD-RTO: 0 /100 WBCS (ref 0–0)
PLATELET # BLD AUTO: 245 X10*3/UL (ref 150–450)
POTASSIUM SERPL-SCNC: 4.2 MMOL/L (ref 3.5–5.3)
PROT SERPL-MCNC: 6.8 G/DL (ref 6.4–8.2)
RBC # BLD AUTO: 4.6 X10*6/UL (ref 4.5–5.9)
SODIUM SERPL-SCNC: 140 MMOL/L (ref 136–145)
TRIGL SERPL-MCNC: 76 MG/DL (ref 0–149)
TSH SERPL-ACNC: 2.42 MIU/L (ref 0.44–3.98)
VLDL: 15 MG/DL (ref 0–40)
WBC # BLD AUTO: 4.4 X10*3/UL (ref 4.4–11.3)

## 2023-12-18 PROCEDURE — 84443 ASSAY THYROID STIM HORMONE: CPT

## 2023-12-18 PROCEDURE — 80061 LIPID PANEL: CPT

## 2023-12-18 PROCEDURE — 83036 HEMOGLOBIN GLYCOSYLATED A1C: CPT

## 2023-12-18 PROCEDURE — 36415 COLL VENOUS BLD VENIPUNCTURE: CPT

## 2023-12-18 PROCEDURE — 80053 COMPREHEN METABOLIC PANEL: CPT

## 2023-12-18 PROCEDURE — 85027 COMPLETE CBC AUTOMATED: CPT

## 2024-03-06 ENCOUNTER — APPOINTMENT (OUTPATIENT)
Dept: PRIMARY CARE | Facility: CLINIC | Age: 79
End: 2024-03-06
Payer: MEDICARE

## 2024-03-06 ENCOUNTER — OFFICE VISIT (OUTPATIENT)
Dept: PRIMARY CARE | Facility: CLINIC | Age: 79
End: 2024-03-06
Payer: MEDICARE

## 2024-03-06 DIAGNOSIS — E53.8 LOW SERUM VITAMIN B12: ICD-10-CM

## 2024-03-06 PROCEDURE — 96372 THER/PROPH/DIAG INJ SC/IM: CPT | Performed by: INTERNAL MEDICINE

## 2024-03-06 PROCEDURE — 1036F TOBACCO NON-USER: CPT | Performed by: INTERNAL MEDICINE

## 2024-03-06 PROCEDURE — 1157F ADVNC CARE PLAN IN RCRD: CPT | Performed by: INTERNAL MEDICINE

## 2024-03-06 PROCEDURE — 1123F ACP DISCUSS/DSCN MKR DOCD: CPT | Performed by: INTERNAL MEDICINE

## 2024-03-06 RX ORDER — CYANOCOBALAMIN 1000 UG/ML
1000 INJECTION, SOLUTION INTRAMUSCULAR; SUBCUTANEOUS ONCE
Status: COMPLETED | OUTPATIENT
Start: 2024-03-06 | End: 2024-03-06

## 2024-03-06 RX ADMIN — CYANOCOBALAMIN 1000 MCG: 1000 INJECTION, SOLUTION INTRAMUSCULAR; SUBCUTANEOUS at 13:42

## 2024-04-05 ENCOUNTER — OFFICE VISIT (OUTPATIENT)
Dept: PRIMARY CARE | Facility: CLINIC | Age: 79
End: 2024-04-05
Payer: MEDICARE

## 2024-04-05 VITALS
DIASTOLIC BLOOD PRESSURE: 80 MMHG | RESPIRATION RATE: 14 BRPM | SYSTOLIC BLOOD PRESSURE: 140 MMHG | OXYGEN SATURATION: 96 % | HEIGHT: 70 IN | BODY MASS INDEX: 25.93 KG/M2 | TEMPERATURE: 97.6 F | HEART RATE: 80 BPM | WEIGHT: 181.1 LBS

## 2024-04-05 DIAGNOSIS — E78.00 PURE HYPERCHOLESTEROLEMIA: ICD-10-CM

## 2024-04-05 DIAGNOSIS — Z98.61 CAD S/P PERCUTANEOUS CORONARY ANGIOPLASTY: ICD-10-CM

## 2024-04-05 DIAGNOSIS — I48.0 PAROXYSMAL ATRIAL FIBRILLATION (MULTI): ICD-10-CM

## 2024-04-05 DIAGNOSIS — I25.10 CAD S/P PERCUTANEOUS CORONARY ANGIOPLASTY: ICD-10-CM

## 2024-04-05 DIAGNOSIS — G70.00 MYASTHENIA GRAVIS (MULTI): ICD-10-CM

## 2024-04-05 DIAGNOSIS — I10 PRIMARY HYPERTENSION: Primary | ICD-10-CM

## 2024-04-05 PROCEDURE — 3077F SYST BP >= 140 MM HG: CPT | Performed by: INTERNAL MEDICINE

## 2024-04-05 PROCEDURE — 1160F RVW MEDS BY RX/DR IN RCRD: CPT | Performed by: INTERNAL MEDICINE

## 2024-04-05 PROCEDURE — 3079F DIAST BP 80-89 MM HG: CPT | Performed by: INTERNAL MEDICINE

## 2024-04-05 PROCEDURE — 1158F ADVNC CARE PLAN TLK DOCD: CPT | Performed by: INTERNAL MEDICINE

## 2024-04-05 PROCEDURE — 99213 OFFICE O/P EST LOW 20 MIN: CPT | Performed by: INTERNAL MEDICINE

## 2024-04-05 PROCEDURE — 1159F MED LIST DOCD IN RCRD: CPT | Performed by: INTERNAL MEDICINE

## 2024-04-05 PROCEDURE — 1123F ACP DISCUSS/DSCN MKR DOCD: CPT | Performed by: INTERNAL MEDICINE

## 2024-04-05 PROCEDURE — 1157F ADVNC CARE PLAN IN RCRD: CPT | Performed by: INTERNAL MEDICINE

## 2024-04-05 RX ORDER — AMLODIPINE BESYLATE 5 MG/1
5 TABLET ORAL DAILY
Qty: 90 TABLET | Refills: 3 | Status: SHIPPED | OUTPATIENT
Start: 2024-04-05 | End: 2025-04-05

## 2024-04-05 RX ORDER — AMLODIPINE BESYLATE 5 MG/1
5 TABLET ORAL DAILY
Qty: 90 TABLET | Refills: 3 | Status: SHIPPED | OUTPATIENT
Start: 2024-04-05 | End: 2024-04-05 | Stop reason: SDUPTHER

## 2024-04-05 ASSESSMENT — ENCOUNTER SYMPTOMS
COUGH: 0
PALPITATIONS: 0
ABDOMINAL PAIN: 0
DIARRHEA: 0
WHEEZING: 0
HYPERTENSION: 1
NAUSEA: 0
CONSTIPATION: 0
SHORTNESS OF BREATH: 0

## 2024-04-05 NOTE — PROGRESS NOTES
"Subjective   Patient ID: David Caldwell is a 78 y.o. male who presents for Hypertension.    Hypertension  Pertinent negatives include no chest pain, palpitations or shortness of breath.        Review of Systems   Respiratory:  Negative for cough, shortness of breath and wheezing.    Cardiovascular:  Negative for chest pain and palpitations.   Gastrointestinal:  Negative for abdominal pain, constipation, diarrhea and nausea.       Objective   /80 (BP Location: Left arm, Patient Position: Sitting, BP Cuff Size: Adult)   Pulse 80   Temp 36.4 °C (97.6 °F) (Tympanic)   Resp 14   Ht 1.778 m (5' 10\")   Wt 82.1 kg (181 lb 1.6 oz)   SpO2 96%   BMI 25.99 kg/m²     Physical Exam  Vitals reviewed.   Constitutional:       Appearance: Normal appearance.   HENT:      Head: Normocephalic.   Cardiovascular:      Rate and Rhythm: Normal rate.   Pulmonary:      Effort: Pulmonary effort is normal.   Musculoskeletal:         General: Normal range of motion.   Neurological:      General: No focal deficit present.      Mental Status: He is alert.   Psychiatric:         Mood and Affect: Mood normal.         Assessment/Plan   Problem List Items Addressed This Visit             ICD-10-CM    Atrial fibrillation (CMS/[facility]) I48.91    Relevant Medications    amLODIPine (Norvasc) 5 mg tablet    CAD S/P percutaneous coronary angioplasty I25.10, Z98.61    Relevant Medications    amLODIPine (Norvasc) 5 mg tablet    Primary hypertension - Primary I10    Relevant Medications    amLODIPine (Norvasc) 5 mg tablet    Pure hypercholesterolemia E78.00    Myasthenia gravis (CMS/[facility]) G70.00   We discussed the above.    We will hav e him start CCB.  Discussed patient's current blood pressure and discussed goal blood pressure of 120/80.  We discussed the benefit of low salt diet and regular physical activity of at least 150 min/week.  We discussed checking their blood pressure outside of the office 2-3 x/week.  Patient is to document their " results and notify the office if blood pressure results are regularly over 130/85.  All questions answered.     Follow up in 2-3 months - sooner if any issues.

## 2024-04-09 ENCOUNTER — OFFICE VISIT (OUTPATIENT)
Dept: CARDIOLOGY | Facility: CLINIC | Age: 79
End: 2024-04-09
Payer: MEDICARE

## 2024-04-09 VITALS
BODY MASS INDEX: 24.91 KG/M2 | SYSTOLIC BLOOD PRESSURE: 128 MMHG | OXYGEN SATURATION: 98 % | HEIGHT: 70 IN | HEART RATE: 59 BPM | DIASTOLIC BLOOD PRESSURE: 70 MMHG | WEIGHT: 174 LBS

## 2024-04-09 DIAGNOSIS — E78.5 DYSLIPIDEMIA: ICD-10-CM

## 2024-04-09 DIAGNOSIS — I48.21 PERMANENT ATRIAL FIBRILLATION (MULTI): ICD-10-CM

## 2024-04-09 DIAGNOSIS — Z98.61 CAD S/P PERCUTANEOUS CORONARY ANGIOPLASTY: Primary | ICD-10-CM

## 2024-04-09 DIAGNOSIS — I10 BENIGN ESSENTIAL HYPERTENSION: ICD-10-CM

## 2024-04-09 DIAGNOSIS — I25.10 CAD S/P PERCUTANEOUS CORONARY ANGIOPLASTY: Primary | ICD-10-CM

## 2024-04-09 PROBLEM — I25.2 HISTORY OF ST ELEVATION MYOCARDIAL INFARCTION (STEMI): Status: RESOLVED | Noted: 2023-02-16 | Resolved: 2024-04-09

## 2024-04-09 PROBLEM — I21.9 HEART ATTACK (MULTI): Status: RESOLVED | Noted: 2023-02-16 | Resolved: 2024-04-09

## 2024-04-09 PROCEDURE — 1157F ADVNC CARE PLAN IN RCRD: CPT | Performed by: INTERNAL MEDICINE

## 2024-04-09 PROCEDURE — 1036F TOBACCO NON-USER: CPT | Performed by: INTERNAL MEDICINE

## 2024-04-09 PROCEDURE — 1123F ACP DISCUSS/DSCN MKR DOCD: CPT | Performed by: INTERNAL MEDICINE

## 2024-04-09 PROCEDURE — 1160F RVW MEDS BY RX/DR IN RCRD: CPT | Performed by: INTERNAL MEDICINE

## 2024-04-09 PROCEDURE — 1126F AMNT PAIN NOTED NONE PRSNT: CPT | Performed by: INTERNAL MEDICINE

## 2024-04-09 PROCEDURE — 99214 OFFICE O/P EST MOD 30 MIN: CPT | Performed by: INTERNAL MEDICINE

## 2024-04-09 PROCEDURE — 1159F MED LIST DOCD IN RCRD: CPT | Performed by: INTERNAL MEDICINE

## 2024-04-09 PROCEDURE — 3074F SYST BP LT 130 MM HG: CPT | Performed by: INTERNAL MEDICINE

## 2024-04-09 PROCEDURE — 3078F DIAST BP <80 MM HG: CPT | Performed by: INTERNAL MEDICINE

## 2024-04-09 PROCEDURE — 93000 ELECTROCARDIOGRAM COMPLETE: CPT | Performed by: INTERNAL MEDICINE

## 2024-04-09 RX ORDER — SIMVASTATIN 20 MG/1
20 TABLET, FILM COATED ORAL NIGHTLY
Qty: 30 TABLET | Refills: 11 | Status: SHIPPED | OUTPATIENT
Start: 2024-04-09 | End: 2024-04-10 | Stop reason: SDUPTHER

## 2024-04-09 ASSESSMENT — PAIN SCALES - GENERAL: PAINLEVEL: 0-NO PAIN

## 2024-04-09 ASSESSMENT — ENCOUNTER SYMPTOMS
OCCASIONAL FEELINGS OF UNSTEADINESS: 0
LOSS OF SENSATION IN FEET: 0
DEPRESSION: 0

## 2024-04-09 NOTE — PROGRESS NOTES
Chief Complaint:   No chief complaint on file.     History Of Present Illness:    David Caldwell is a 78 y.o. male with a history of myasthenia gravis, permanent atrial fibrillation, CAD (PCI of RCA 2021 in the setting of NSTEMI, LAD noted to be occluded), dyslipidemia, CVA, and hypertension here for establishment of cardiovascular care.  The patient had previously been seen by Dr. Palencia.    Stated the symptoms are predominated by the myasthenia gravis.  He will have weakness of the legs.  He denies any exertional chest pain or shortness of breath.  Every so often he will find himself panting a little bit but does not believe that this is specifically related to activity.    Had a CVA in November 2023.  Went to the [facility].  Saw a neurologist afterwards who recommended that he be on statin.  The patient says that he had been on atorvastatin and rosuvastatin in the past however had significant bone pain and muscle pain.  He has not been taking the rosuvastatin even though it is listed.    He recently had his losartan switched to amlodipine by his PCP last week.    He tells me that he graduated from [employer] and went to college at [employer].    Echocardiogram 9/5/2021: EF 55 to 60%.  Grade 1 diastolic dysfunction.  Mild to moderate left atrial enlargement.    PCI to RCA 9/4/2021: 3.5 x 18 mm [place] KENYA.  LAD occluded proximally.  Large dominant RCA with 90% mid stenosis.     Past Medical History:  He has no past medical history on file.    Past Surgical History:  He has a past surgical history that includes Other surgical history (09/14/2020).      Social History:  He reports that he has never smoked. He has never used smokeless tobacco. He reports current alcohol use of about 1.0 standard drink of alcohol per week. He reports that he does not currently use drugs.    Family History:  No family history on file.     Allergies:  Morphine, Opioids-meperidine and related, Penicillins,  "Rosuvastatin, and Opioids - morphine analogues    Outpatient Medications:  Current Outpatient Medications   Medication Instructions    amLODIPine (NORVASC) 5 mg, oral, Daily    azaTHIOprine (Imuran) 50 mg tablet 2 tablets, oral, 2 times daily    cholecalciferol (Vitamin D-3) 50 MCG (2000 UT) tablet 1 tablet, oral, Daily    ketoconazole (NIZOral) 2 % cream 1 Application, Topical, 2 times daily    pantoprazole (PROTONIX) 40 mg, oral, Daily    tadalafil (CIALIS) 10 mg, oral, Daily PRN    tamsulosin (FLOMAX) 0.4 mg, oral, Daily    Xarelto 20 mg, oral, Daily with evening meal       Last Recorded Vitals:  Visit Vitals  /70 (BP Location: Right arm, Patient Position: Sitting)   Pulse 59   Ht 1.778 m (5' 10\")   Wt 78.9 kg (174 lb)   SpO2 98%   BMI 24.97 kg/m²   Smoking Status Never   BSA 1.97 m²      LASTWT(3):   Wt Readings from Last 3 Encounters:   04/09/24 78.9 kg (174 lb)   04/05/24 82.1 kg (181 lb 1.6 oz)   12/06/23 81.6 kg (180 lb)       Physical Exam:  In general: alert and in no acute distress.   HEENT: Carotid upstrokes normal with no bruits. JVP is normal.   Pulmonary: Clear to auscultation bilaterally.  Cardiovascular: S1,S2, regular. No appreciable murmurs, rubs or gallops.   Lower extremities: Warm. 2+ distal pulses. No edema.     Last Labs:  CBC -  Recent Labs     12/18/23  1031 10/05/23  1143 04/11/23  1045   WBC 4.4 4.4 4.6   HGB 11.6* 10.7* 11.2*   HCT 39.0* 36.4* 37.3*    235 190   MCV 85 82 81       CMP -  Recent Labs     12/18/23  1031 10/05/23  1143 04/11/23  1045 09/06/21  0604 09/05/21  0612 09/04/21  1851    138 140   < > 139 140   K 4.2 4.4 4.7   < > 3.9 4.3    105 106   < > 108* 103   CO2 27 28 29   < > 24 24   ANIONGAP 11 9* 10   < > 11 17   BUN 17 19 20   < > 19 22   CREATININE 1.02 1.07 1.05   < > 0.90 1.13   EGFR 75 71  --   --   --   --    MG  --   --   --   --  1.90 2.10    < > = values in this interval not displayed.     Recent Labs     12/18/23  1031 10/05/23  1143 " 04/11/23  1045   ALBUMIN 3.8 3.8 4.1   ALKPHOS 69 65 66   ALT 6* 11 9*   AST 15 19 14   BILITOT 1.1 1.0 1.0       LIPID PANEL -   Recent Labs     12/18/23  1031 04/11/23  1045 04/13/22  0946 12/11/21  1100   CHOL 177 173 184 208*   LDLCALC 125*  --   --   --    LDLF  --  118* 126* 150*   HDL 37.3 42.4 43.0 36.0*   TRIG 76 64 73 109       Recent Labs     12/18/23  1031 11/28/23  0329 09/05/21  0612 09/04/21  1851   BNP  --   --   --  170*   HGBA1C 5.3 5.5 5.1  --            Assessment/Plan   1) CAD: PCI to RCA 2021 in setting of NSTEMI.  LAD noted to be occluded at that time.  I personally reviewed the catheterization films from 2021.  The  of the LAD is collateralized by the RCA and the LCx.  He is having no symptoms of exertional chest pain or shortness of breath.  For now we will continue with medical therapy.    He cannot be on beta-blocker because of his myasthenia gravis.  He is on Xarelto for his atrial fibrillation and as such does not need aspirin therapy.    I do recommend that he be on statin therapy.  See the discussion below.    2) permanent atrial fibrillation: On anticoagulation with Xarelto.    3) dyslipidemia: LDL not at goal.  Intolerant of rosuvastatin and atorvastatin due to statin induced myalgias and bone pain.  Asked him to try simvastatin 20 mg daily.  If he is tolerant of this we will recheck his lipids and see if he is at goal.  If not we can always consider adding ezetimibe.    If he is intolerant of simvastatin as well we will consider the use of Nexletol or PCSK9 inhibitor.    4) hypertension: Blood pressure controlled.  No changes needed.    5) follow-up: 6 months or sooner if needed      Eduardo Alegre MD

## 2024-04-10 DIAGNOSIS — E78.5 DYSLIPIDEMIA: ICD-10-CM

## 2024-04-10 RX ORDER — SIMVASTATIN 20 MG/1
20 TABLET, FILM COATED ORAL NIGHTLY
Qty: 90 TABLET | Refills: 3 | Status: SHIPPED | OUTPATIENT
Start: 2024-04-10 | End: 2024-06-05 | Stop reason: WASHOUT

## 2024-06-05 ENCOUNTER — OFFICE VISIT (OUTPATIENT)
Dept: PRIMARY CARE | Facility: CLINIC | Age: 79
End: 2024-06-05
Payer: MEDICARE

## 2024-06-05 VITALS
SYSTOLIC BLOOD PRESSURE: 130 MMHG | HEIGHT: 70 IN | OXYGEN SATURATION: 98 % | TEMPERATURE: 98 F | HEART RATE: 70 BPM | DIASTOLIC BLOOD PRESSURE: 80 MMHG | BODY MASS INDEX: 25.91 KG/M2 | WEIGHT: 181 LBS | RESPIRATION RATE: 14 BRPM

## 2024-06-05 DIAGNOSIS — E53.8 LOW SERUM VITAMIN B12: ICD-10-CM

## 2024-06-05 DIAGNOSIS — I48.21 PERMANENT ATRIAL FIBRILLATION (MULTI): ICD-10-CM

## 2024-06-05 DIAGNOSIS — Z79.01 ANTICOAGULATED: ICD-10-CM

## 2024-06-05 DIAGNOSIS — Z98.61 CAD S/P PERCUTANEOUS CORONARY ANGIOPLASTY: Primary | ICD-10-CM

## 2024-06-05 DIAGNOSIS — E78.5 DYSLIPIDEMIA: ICD-10-CM

## 2024-06-05 DIAGNOSIS — I10 BENIGN ESSENTIAL HYPERTENSION: ICD-10-CM

## 2024-06-05 DIAGNOSIS — I25.10 CAD S/P PERCUTANEOUS CORONARY ANGIOPLASTY: Primary | ICD-10-CM

## 2024-06-05 PROCEDURE — 3075F SYST BP GE 130 - 139MM HG: CPT | Performed by: INTERNAL MEDICINE

## 2024-06-05 PROCEDURE — 99214 OFFICE O/P EST MOD 30 MIN: CPT | Performed by: INTERNAL MEDICINE

## 2024-06-05 PROCEDURE — 96372 THER/PROPH/DIAG INJ SC/IM: CPT | Performed by: INTERNAL MEDICINE

## 2024-06-05 PROCEDURE — 1036F TOBACCO NON-USER: CPT | Performed by: INTERNAL MEDICINE

## 2024-06-05 PROCEDURE — 1157F ADVNC CARE PLAN IN RCRD: CPT | Performed by: INTERNAL MEDICINE

## 2024-06-05 PROCEDURE — 1159F MED LIST DOCD IN RCRD: CPT | Performed by: INTERNAL MEDICINE

## 2024-06-05 PROCEDURE — 1123F ACP DISCUSS/DSCN MKR DOCD: CPT | Performed by: INTERNAL MEDICINE

## 2024-06-05 PROCEDURE — 3079F DIAST BP 80-89 MM HG: CPT | Performed by: INTERNAL MEDICINE

## 2024-06-05 PROCEDURE — 1158F ADVNC CARE PLAN TLK DOCD: CPT | Performed by: INTERNAL MEDICINE

## 2024-06-05 RX ORDER — PRAVASTATIN SODIUM 20 MG/1
20 TABLET ORAL DAILY
Qty: 100 TABLET | Refills: 3 | Status: SHIPPED | OUTPATIENT
Start: 2024-06-05 | End: 2025-07-10

## 2024-06-05 RX ORDER — CYANOCOBALAMIN 1000 UG/ML
1000 INJECTION, SOLUTION INTRAMUSCULAR; SUBCUTANEOUS ONCE
Status: COMPLETED | OUTPATIENT
Start: 2024-06-05 | End: 2024-06-05

## 2024-06-05 RX ADMIN — CYANOCOBALAMIN 1000 MCG: 1000 INJECTION, SOLUTION INTRAMUSCULAR; SUBCUTANEOUS at 13:44

## 2024-06-05 ASSESSMENT — ENCOUNTER SYMPTOMS
COUGH: 0
ABDOMINAL PAIN: 0
NAUSEA: 0
SHORTNESS OF BREATH: 0
PALPITATIONS: 0
CONSTIPATION: 0
WHEEZING: 0
DIARRHEA: 0

## 2024-06-05 NOTE — PROGRESS NOTES
"Subjective   Patient ID: David Caldwell is a 78 y.o. male who presents for Hypertension.    Feeling well for the most part.  We has been unable to tolerate statins to this point in time due to mylagias.  He does have a heart history and we discussed the importance of this medication.    He is active and denies any issues with CP, SOB or dizzy spells.      Review of Systems   Respiratory:  Negative for cough, shortness of breath and wheezing.    Cardiovascular:  Negative for chest pain and palpitations.   Gastrointestinal:  Negative for abdominal pain, constipation, diarrhea and nausea.       Objective   /80 (BP Location: Left arm, Patient Position: Sitting, BP Cuff Size: Adult)   Pulse 70   Temp 36.7 °C (98 °F) (Tympanic)   Resp 14   Ht 1.778 m (5' 10\")   Wt 82.1 kg (181 lb)   SpO2 98%   BMI 25.97 kg/m²     Physical Exam  Vitals reviewed.   Constitutional:       Appearance: Normal appearance.   HENT:      Head: Normocephalic.   Cardiovascular:      Rate and Rhythm: Normal rate.   Pulmonary:      Effort: Pulmonary effort is normal.   Musculoskeletal:         General: Normal range of motion.   Neurological:      General: No focal deficit present.      Mental Status: He is alert.   Psychiatric:         Mood and Affect: Mood normal.         Assessment/Plan   Problem List Items Addressed This Visit             ICD-10-CM    Permanent atrial fibrillation (Multi) I48.21    Relevant Orders    Thyroid Stimulating Hormone    CAD S/P percutaneous coronary angioplasty - Primary I25.10, Z98.61    Relevant Medications    pravastatin (Pravachol) 20 mg tablet    Benign essential hypertension I10    Relevant Orders    Comprehensive Metabolic Panel    Dyslipidemia E78.5    Relevant Orders    Lipid Panel    Thyroid Stimulating Hormone     Other Visit Diagnoses         Codes    Low serum vitamin B12     E53.8    Relevant Medications    cyanocobalamin (Vitamin B-12) injection 1,000 mcg (Completed)    Anticoagulated     " Z79.01    Relevant Orders    CBC        CAD is clinically stable.  Myalgias from simvastatin - we will switch to pravastatin.    HTN is controlled.  No changes needed.    Tolerating blood thinner.    We will see him back in 6 months - sooner if any issues.

## 2024-10-09 NOTE — PROGRESS NOTES
Chief Complaint:   No chief complaint on file.     History Of Present Illness:    David Caldwell is a 79 y.o. male with a history of myasthenia gravis, permanent atrial fibrillation, CAD (PCI of RCA 2021 in the setting of NSTEMI, LAD noted to be occluded), dyslipidemia, CVA, and hypertension here for follow-up.    Says that he started noticing shortness of breath walking up a flight of stairs since we last spoke.  Can get to the top the steps but has to stop and catch his breath.  No associated chest pain.    Tried pravastatin and had muscle aches.  Also had myalgias with atorvastatin and simvastatin.    Had a CVA in November 2023.  Went to the [facility].  Saw a neurologist afterwards who recommended that he be on statin.  The patient says that he had been on atorvastatin and rosuvastatin in the past however had significant bone pain and muscle pain.  He has not been taking the rosuvastatin even though it is listed.    He tells me that he graduated from [employer] and went to college at [employer].    Echocardiogram 9/5/2021: EF 55 to 60%.  Grade 1 diastolic dysfunction.  Mild to moderate left atrial enlargement.    PCI to RCA 9/4/2021: 3.5 x 18 mm [place] KENYA.  LAD occluded proximally.  Large dominant RCA with 90% mid stenosis.     Past Medical History:  He has no past medical history on file.    Past Surgical History:  He has a past surgical history that includes Other surgical history (09/14/2020).      Social History:  He reports that he has never smoked. He has never used smokeless tobacco. He reports current alcohol use of about 1.0 standard drink of alcohol per week. He reports that he does not currently use drugs.    Family History:  No family history on file.     Allergies:  Morphine, Opioids-meperidine and related, Penicillins, Rosuvastatin, and Opioids - morphine analogues    Outpatient Medications:  Current Outpatient Medications   Medication Instructions    amLODIPine (NORVASC) 5  "mg, oral, Daily    azaTHIOprine (Imuran) 50 mg tablet 2 tablets, oral, 2 times daily    cholecalciferol (Vitamin D-3) 50 MCG (2000 UT) tablet 1 tablet, oral, Daily    pantoprazole (PROTONIX) 40 mg, oral, Daily    tadalafil (CIALIS) 10 mg, oral, Daily PRN    Xarelto 20 mg, oral, Daily with evening meal       Last Recorded Vitals:  Visit Vitals  /80 (BP Location: Left arm, Patient Position: Sitting, BP Cuff Size: Adult)   Pulse 66   Resp 16   Ht 1.778 m (5' 10\")   Wt 83 kg (183 lb)   SpO2 96%   BMI 26.26 kg/m²   Smoking Status Never   BSA 2.02 m²      LASTWT(3):   Wt Readings from Last 3 Encounters:   10/10/24 83 kg (183 lb)   06/05/24 82.1 kg (181 lb)   04/09/24 78.9 kg (174 lb)       Physical Exam:  In general: alert and in no acute distress.   HEENT: Carotid upstrokes normal with no bruits. JVP is normal.   Pulmonary: Clear to auscultation bilaterally.  Cardiovascular: S1,S2, regular. No appreciable murmurs, rubs or gallops.   Lower extremities: Warm. 2+ distal pulses. No edema.     Last Labs:  CBC -  Recent Labs     12/18/23  1031 10/05/23  1143 04/11/23  1045   WBC 4.4 4.4 4.6   HGB 11.6* 10.7* 11.2*   HCT 39.0* 36.4* 37.3*    235 190   MCV 85 82 81       CMP -  Recent Labs     12/18/23  1031 10/05/23  1143 04/11/23  1045 09/06/21  0604 09/05/21  0612 09/04/21  1851    138 140   < > 139 140   K 4.2 4.4 4.7   < > 3.9 4.3    105 106   < > 108* 103   CO2 27 28 29   < > 24 24   ANIONGAP 11 9* 10   < > 11 17   BUN 17 19 20   < > 19 22   CREATININE 1.02 1.07 1.05   < > 0.90 1.13   EGFR 75 71  --   --   --   --    MG  --   --   --   --  1.90 2.10    < > = values in this interval not displayed.     Recent Labs     12/18/23  1031 10/05/23  1143 04/11/23  1045   ALBUMIN 3.8 3.8 4.1   ALKPHOS 69 65 66   ALT 6* 11 9*   AST 15 19 14   BILITOT 1.1 1.0 1.0       LIPID PANEL -   Recent Labs     12/18/23  1031 04/11/23  1045 04/13/22  0946 12/11/21  1100   CHOL 177 173 184 208*   LDLCALC 125*  --   --   " --    LDLF  --  118* 126* 150*   HDL 37.3 42.4 43.0 36.0*   TRIG 76 64 73 109       Recent Labs     12/18/23  1031 11/28/23  0329 09/05/21  0612 09/04/21  1851   BNP  --   --   --  170*   HGBA1C 5.3 5.5 5.1  --            Assessment/Plan   1) CAD: PCI to RCA 2021 in setting of NSTEMI.  LAD noted to be occluded at that time.  I once again personally reviewed the catheterization films from 2021 long with the patient.  The  of the LAD is collateralized by the RCA and the LCx.  He is now having exertional shortness of breath concerning for progression of underlying disease.  This very well could be progression of circumflex disease or more symptomatic LAD disease.    I have decided to proceed with Lexiscan nuclear stress testing.  Further recommendation based on those results.    2) permanent atrial fibrillation: On anticoagulation with Xarelto.    3) dyslipidemia: LDL not at goal.  Significant statin induced myalgias with rosuvastatin, atorvastatin and pravastatin.  Will order ezetimibe 10 mg once a day and recheck lipids in 3 months.  I assume that his LDL will still be greater than 70 at which point I would like to see if we can get Nexletol approved.    Could always consider emanation Nexletol and ezetimibe if he is tolerant of each.    4) hypertension: Blood pressure controlled.  No changes needed.    5) follow-up: 2-month follow-up with NP or sooner if needed.      Eduardo Alegre MD

## 2024-10-10 ENCOUNTER — APPOINTMENT (OUTPATIENT)
Dept: CARDIOLOGY | Facility: CLINIC | Age: 79
End: 2024-10-10
Payer: MEDICARE

## 2024-10-10 VITALS
RESPIRATION RATE: 16 BRPM | HEART RATE: 66 BPM | WEIGHT: 183 LBS | DIASTOLIC BLOOD PRESSURE: 80 MMHG | OXYGEN SATURATION: 96 % | HEIGHT: 70 IN | BODY MASS INDEX: 26.2 KG/M2 | SYSTOLIC BLOOD PRESSURE: 130 MMHG

## 2024-10-10 DIAGNOSIS — I20.89 ATYPICAL ANGINA (CMS-HCC): ICD-10-CM

## 2024-10-10 DIAGNOSIS — E78.5 DYSLIPIDEMIA: ICD-10-CM

## 2024-10-10 DIAGNOSIS — I10 BENIGN ESSENTIAL HYPERTENSION: ICD-10-CM

## 2024-10-10 DIAGNOSIS — I48.21 PERMANENT ATRIAL FIBRILLATION (MULTI): ICD-10-CM

## 2024-10-10 DIAGNOSIS — Z98.61 CAD S/P PERCUTANEOUS CORONARY ANGIOPLASTY: Primary | ICD-10-CM

## 2024-10-10 DIAGNOSIS — I25.10 CAD S/P PERCUTANEOUS CORONARY ANGIOPLASTY: Primary | ICD-10-CM

## 2024-10-10 PROCEDURE — 99214 OFFICE O/P EST MOD 30 MIN: CPT | Performed by: INTERNAL MEDICINE

## 2024-10-10 PROCEDURE — 1159F MED LIST DOCD IN RCRD: CPT | Performed by: INTERNAL MEDICINE

## 2024-10-10 PROCEDURE — 1157F ADVNC CARE PLAN IN RCRD: CPT | Performed by: INTERNAL MEDICINE

## 2024-10-10 PROCEDURE — 1160F RVW MEDS BY RX/DR IN RCRD: CPT | Performed by: INTERNAL MEDICINE

## 2024-10-10 PROCEDURE — 3075F SYST BP GE 130 - 139MM HG: CPT | Performed by: INTERNAL MEDICINE

## 2024-10-10 PROCEDURE — 1036F TOBACCO NON-USER: CPT | Performed by: INTERNAL MEDICINE

## 2024-10-10 PROCEDURE — 1123F ACP DISCUSS/DSCN MKR DOCD: CPT | Performed by: INTERNAL MEDICINE

## 2024-10-10 PROCEDURE — 3079F DIAST BP 80-89 MM HG: CPT | Performed by: INTERNAL MEDICINE

## 2024-10-10 RX ORDER — AMINOPHYLLINE 25 MG/ML
125 INJECTION, SOLUTION INTRAVENOUS ONCE AS NEEDED
OUTPATIENT
Start: 2024-10-10

## 2024-10-10 RX ORDER — REGADENOSON 0.08 MG/ML
0.4 INJECTION, SOLUTION INTRAVENOUS
OUTPATIENT
Start: 2024-10-10

## 2024-10-10 RX ORDER — EZETIMIBE 10 MG/1
10 TABLET ORAL DAILY
Qty: 30 TABLET | Refills: 11 | Status: SHIPPED | OUTPATIENT
Start: 2024-10-10 | End: 2025-10-10

## 2024-10-25 ENCOUNTER — HOSPITAL ENCOUNTER (OUTPATIENT)
Dept: RADIOLOGY | Facility: HOSPITAL | Age: 79
Discharge: HOME | End: 2024-10-25
Payer: MEDICARE

## 2024-10-25 ENCOUNTER — HOSPITAL ENCOUNTER (OUTPATIENT)
Dept: CARDIOLOGY | Facility: HOSPITAL | Age: 79
Discharge: HOME | End: 2024-10-25
Payer: MEDICARE

## 2024-10-25 DIAGNOSIS — I20.89 ATYPICAL ANGINA (CMS-HCC): ICD-10-CM

## 2024-10-25 DIAGNOSIS — Z98.61 CAD S/P PERCUTANEOUS CORONARY ANGIOPLASTY: ICD-10-CM

## 2024-10-25 DIAGNOSIS — I25.10 CAD S/P PERCUTANEOUS CORONARY ANGIOPLASTY: ICD-10-CM

## 2024-10-25 PROCEDURE — 78452 HT MUSCLE IMAGE SPECT MULT: CPT

## 2024-10-25 PROCEDURE — 93017 CV STRESS TEST TRACING ONLY: CPT

## 2024-10-25 PROCEDURE — A9502 TC99M TETROFOSMIN: HCPCS | Performed by: INTERNAL MEDICINE

## 2024-10-25 PROCEDURE — 3430000001 HC RX 343 DIAGNOSTIC RADIOPHARMACEUTICALS: Performed by: INTERNAL MEDICINE

## 2024-10-29 ENCOUNTER — TELEPHONE (OUTPATIENT)
Dept: CARDIOLOGY | Facility: CLINIC | Age: 79
End: 2024-10-29
Payer: MEDICARE

## 2024-11-09 ENCOUNTER — TELEPHONE (OUTPATIENT)
Dept: PRIMARY CARE | Facility: CLINIC | Age: 79
End: 2024-11-09
Payer: MEDICARE

## 2024-12-10 ENCOUNTER — APPOINTMENT (OUTPATIENT)
Dept: CARDIOLOGY | Facility: CLINIC | Age: 79
End: 2024-12-10
Payer: MEDICARE

## 2024-12-10 VITALS
OXYGEN SATURATION: 98 % | RESPIRATION RATE: 18 BRPM | HEIGHT: 70 IN | WEIGHT: 183.8 LBS | BODY MASS INDEX: 26.31 KG/M2 | HEART RATE: 61 BPM | SYSTOLIC BLOOD PRESSURE: 126 MMHG | DIASTOLIC BLOOD PRESSURE: 64 MMHG

## 2024-12-10 DIAGNOSIS — I48.21 PERMANENT ATRIAL FIBRILLATION (MULTI): ICD-10-CM

## 2024-12-10 DIAGNOSIS — R06.09 DOE (DYSPNEA ON EXERTION): Primary | ICD-10-CM

## 2024-12-10 DIAGNOSIS — I25.10 CAD S/P PERCUTANEOUS CORONARY ANGIOPLASTY: ICD-10-CM

## 2024-12-10 DIAGNOSIS — Z98.61 CAD S/P PERCUTANEOUS CORONARY ANGIOPLASTY: ICD-10-CM

## 2024-12-10 DIAGNOSIS — E78.5 DYSLIPIDEMIA: ICD-10-CM

## 2024-12-10 DIAGNOSIS — I10 BENIGN ESSENTIAL HYPERTENSION: ICD-10-CM

## 2024-12-10 PROBLEM — G52.9 CRANIAL NERVE DISORDER: Status: ACTIVE | Noted: 2020-12-13

## 2024-12-10 PROBLEM — I63.9 CEREBRAL INFARCTION: Status: ACTIVE | Noted: 2020-12-14

## 2024-12-10 PROBLEM — F40.240 CLAUSTROPHOBIA: Status: ACTIVE | Noted: 2020-12-14

## 2024-12-10 PROBLEM — H02.402 PTOSIS OF LEFT EYELID: Status: ACTIVE | Noted: 2018-02-24

## 2024-12-10 PROBLEM — D64.9 ANEMIA: Status: ACTIVE | Noted: 2024-12-10

## 2024-12-10 PROBLEM — Z96.1 PSEUDOPHAKIA: Status: ACTIVE | Noted: 2019-01-16

## 2024-12-10 PROBLEM — G47.33 OSA (OBSTRUCTIVE SLEEP APNEA): Status: ACTIVE | Noted: 2017-08-04

## 2024-12-10 PROBLEM — R41.841 COGNITIVE COMMUNICATION DEFICIT: Status: ACTIVE | Noted: 2023-12-21

## 2024-12-10 PROBLEM — H43.813 POSTERIOR VITREOUS DETACHMENT OF BOTH EYES: Status: ACTIVE | Noted: 2019-01-16

## 2024-12-10 PROBLEM — S62.009A CLOSED FRACTURE OF SCAPHOID BONE OF WRIST: Status: ACTIVE | Noted: 2017-06-30

## 2024-12-10 PROBLEM — H49.12: Status: ACTIVE | Noted: 2020-12-08

## 2024-12-10 PROBLEM — I63.81 THALAMIC STROKE (MULTI): Status: ACTIVE | Noted: 2023-11-28

## 2024-12-10 PROBLEM — Z79.60 LONG-TERM USE OF IMMUNOSUPPRESSANT MEDICATION: Status: ACTIVE | Noted: 2018-02-02

## 2024-12-10 PROBLEM — M47.812 CERVICAL SPONDYLOSIS WITHOUT MYELOPATHY: Status: ACTIVE | Noted: 2019-03-12

## 2024-12-10 PROBLEM — R41.3 MEMORY LOSS: Status: ACTIVE | Noted: 2021-04-01

## 2024-12-10 PROCEDURE — 3078F DIAST BP <80 MM HG: CPT | Performed by: NURSE PRACTITIONER

## 2024-12-10 PROCEDURE — 3074F SYST BP LT 130 MM HG: CPT | Performed by: NURSE PRACTITIONER

## 2024-12-10 PROCEDURE — 1157F ADVNC CARE PLAN IN RCRD: CPT | Performed by: NURSE PRACTITIONER

## 2024-12-10 PROCEDURE — 1160F RVW MEDS BY RX/DR IN RCRD: CPT | Performed by: NURSE PRACTITIONER

## 2024-12-10 PROCEDURE — 99214 OFFICE O/P EST MOD 30 MIN: CPT | Performed by: NURSE PRACTITIONER

## 2024-12-10 PROCEDURE — 1123F ACP DISCUSS/DSCN MKR DOCD: CPT | Performed by: NURSE PRACTITIONER

## 2024-12-10 PROCEDURE — 1159F MED LIST DOCD IN RCRD: CPT | Performed by: NURSE PRACTITIONER

## 2024-12-10 PROCEDURE — 1036F TOBACCO NON-USER: CPT | Performed by: NURSE PRACTITIONER

## 2024-12-10 NOTE — PATIENT INSTRUCTIONS
- blood work in January. Please fast. True 12hr fast, only water  - follow up with Dr. Alegre 6 months    It was my pleasure to meet you. I look forward to being your cardiac Nurse Practitioner. I am a huge believer in communicating with my patients. Please contact me at any time, if anything is not clear to you regarding anything we have discussed, or if new questions occur to you.

## 2024-12-10 NOTE — PROGRESS NOTES
Name : David Caldwell   : 1945   MRN : [id number]   ENC Date : 12/10/2024    Primary Cardiologist: Dr. Alegre     CC: Shortness of Breath, Hypertension, Atrial Fibrillation, CVA, and DLD     HPI:    David Caldwell is a 79 y.o. male with PMHx sig for myasthenia gravis, permanent atrial fibrillation, CAD (PCI of RCA  in the setting of NSTEMI, LAD noted to be occluded), dyslipidemia, CVA, and hypertension who presents today on follow up GUAJARDO.    Nuclear stress test was done for this GUAJARDO & did not show any new areas of ischemia.    He reports that he primarily gets this SOB at 11pm when he's walking up the steps to bed, after the news when his muscles are weak from the MG.    He is able to exercise 3x a week at [employer] doing light weights & exercise bike for 45 mins without any chest pain, pressure, SOB/GUAJARDO, PND, orthopnea, LE edema, palpitations, lightheadedness, dizziness, or syncope.     CV Diagnostics:  Lexiscan 10/25/24:  1. Moderate area of moderate to severe reversible perfusion defect in the mid anterior wall extending into the apex concerning for ischemia of the LAD territory.  2. Normal LV function, EF 61%, with normal wall motion and thickening in all segments.  3. Patient has a known occluded LAD by catheterization in . The findings of this nuclear stress test would indicate findings consistent with known occluded LAD.    Echocardiogram 2021: EF 55 to 60%.  Grade 1 diastolic dysfunction.  Mild to moderate left atrial enlargement.     PCI to RCA 2021: 3.5 x 18 mm [place] KENYA.  LAD occluded proximally.  Large dominant RCA with 90% mid stenosis.    ROS: unless otherwise noted in the history of present illness, all other systems were reviewed and they are negative for complaints     Allergies:  Morphine, Opioids-meperidine and related, Penicillins, Rosuvastatin, and Opioids - morphine analogues    Current Outpatient Medications   Medication Instructions    amLODIPine (NORVASC) 5 mg,  "oral, Daily    azaTHIOprine (Imuran) 50 mg tablet 2 tablets, 2 times daily    ezetimibe (ZETIA) 10 mg, oral, Daily    tadalafil (CIALIS) 10 mg, oral, Daily PRN    Xarelto 20 mg, Daily with evening meal        Last Labs:  CBC  Lab Results   Component Value Date    WBC 4.4 12/18/2023    HGB 11.6 (L) 12/18/2023    HCT 39.0 (L) 12/18/2023    MCV 85 12/18/2023     12/18/2023       CMP  Lab Results   Component Value Date    CALCIUM 9.1 12/18/2023    PHOS 2.8 09/05/2021    PROT 6.8 12/18/2023    ALBUMIN 3.8 12/18/2023    AST 15 12/18/2023    ALT 6 (L) 12/18/2023    ALKPHOS 69 12/18/2023    BILITOT 1.1 12/18/2023       BMP   Lab Results   Component Value Date     12/18/2023    K 4.2 12/18/2023     12/18/2023    CO2 27 12/18/2023    GLUCOSE 85 12/18/2023    BUN 17 12/18/2023    CREATININE 1.02 12/18/2023       LIPID PANEL   Lab Results   Component Value Date    CHOL 177 12/18/2023    TRIG 76 12/18/2023    HDL 37.3 12/18/2023    CHHDL 4.7 12/18/2023    LDLF 118 (H) 04/11/2023    VLDL 15 12/18/2023    NHDL 140 12/18/2023       RENAL FUNCTION PANEL   Lab Results   Component Value Date    GLUCOSE 85 12/18/2023     12/18/2023    K 4.2 12/18/2023     12/18/2023    CO2 27 12/18/2023    ANIONGAP 11 12/18/2023    BUN 17 12/18/2023    CREATININE 1.02 12/18/2023    GFRMALE 72 04/11/2023    CALCIUM 9.1 12/18/2023    PHOS 2.8 09/05/2021    ALBUMIN 3.8 12/18/2023        Lab Results   Component Value Date     (H) 09/04/2021    HGBA1C 5.3 12/18/2023     I have reviewed the above labs & diagnostics    Last Recorded Vitals:  Vitals:    12/10/24 1409   BP: 126/64   BP Location: Left arm   Patient Position: Sitting   Pulse: 61   Resp: 18   SpO2: 98%   Weight: 83.4 kg (183 lb 12.8 oz)   Height: 1.778 m (5' 10\")     Physical Exam:  On exam Mr. David Caldwell appears his stated age, is alert and oriented x3, and in no acute distress. His sclera are anicteric and his oropharynx has moist mucous membranes. " His neck is supple and without thyromegaly. The JVP is ~5 cm of water above the right atrium. His cardiac exam has regular rhythm, normal S1, S2. No S3/4. There are no murmurs. His lungs are clear to auscultation bilaterally and there is no dullness to percussion. His abdomen is soft, nontender with normoactive bowel sounds. There is no HJR. The extremities are warm and without edema. The skin is dry. There is no rash present. The distal pulses are 2-3+ in all four extremities. His mood and affect are appropriate for todays encounter.     Assessment/Plan:  1) CAD: PCI to RCA 2021 in setting of NSTEMI.  LAD noted to be occluded at that time.  I once again personally reviewed the catheterization films from 2021 long with the patient.  The  of the LAD is collateralized by the RCA and the LCx. His lexiscan did not show any new areas of ischemia. As we discussed his GUAJARDO, it seems a little more consistent with physical deconditioning. He is already hemodynamically optimized so I would just continue to monitor him at this time & encourage increase in exercise.    2) permanent atrial fibrillation: On anticoagulation with Xarelto.     3) dyslipidemia: LDL not at goal.  Significant statin induced myalgias with rosuvastatin, atorvastatin and pravastatin. Started on Zetia in October, needs repeat lipid panel in January.  Dr. Alegre is recommending Nexletol if LDL still not < 70.     4) hypertension: Blood pressure controlled.  No changes needed.    Follow up with Dr. Alegre in 6 months or sooner if needed.    Tracy M Schwab, APRN-CNP

## 2024-12-13 ENCOUNTER — HOSPITAL ENCOUNTER (OUTPATIENT)
Dept: RADIOLOGY | Facility: CLINIC | Age: 79
Discharge: HOME | End: 2024-12-13
Payer: MEDICARE

## 2024-12-13 ENCOUNTER — APPOINTMENT (OUTPATIENT)
Dept: PRIMARY CARE | Facility: CLINIC | Age: 79
End: 2024-12-13
Payer: MEDICARE

## 2024-12-13 VITALS
SYSTOLIC BLOOD PRESSURE: 128 MMHG | HEART RATE: 70 BPM | DIASTOLIC BLOOD PRESSURE: 68 MMHG | OXYGEN SATURATION: 98 % | HEIGHT: 70 IN | RESPIRATION RATE: 14 BRPM | WEIGHT: 181 LBS | TEMPERATURE: 97.9 F | BODY MASS INDEX: 25.91 KG/M2

## 2024-12-13 DIAGNOSIS — I10 BENIGN ESSENTIAL HYPERTENSION: ICD-10-CM

## 2024-12-13 DIAGNOSIS — D84.821 IMMUNODEFICIENCY DUE TO DRUGS (CODE): ICD-10-CM

## 2024-12-13 DIAGNOSIS — R39.11 BENIGN PROSTATIC HYPERPLASIA WITH URINARY HESITANCY: ICD-10-CM

## 2024-12-13 DIAGNOSIS — M25.512 ACUTE PAIN OF LEFT SHOULDER: ICD-10-CM

## 2024-12-13 DIAGNOSIS — I48.21 PERMANENT ATRIAL FIBRILLATION (MULTI): ICD-10-CM

## 2024-12-13 DIAGNOSIS — I10 PRIMARY HYPERTENSION: Primary | ICD-10-CM

## 2024-12-13 DIAGNOSIS — N40.1 BENIGN PROSTATIC HYPERPLASIA WITH URINARY HESITANCY: ICD-10-CM

## 2024-12-13 DIAGNOSIS — Z12.5 SCREENING FOR PROSTATE CANCER: ICD-10-CM

## 2024-12-13 DIAGNOSIS — E78.00 PURE HYPERCHOLESTEROLEMIA: ICD-10-CM

## 2024-12-13 DIAGNOSIS — R53.83 OTHER FATIGUE: ICD-10-CM

## 2024-12-13 PROCEDURE — 73030 X-RAY EXAM OF SHOULDER: CPT | Mod: LT

## 2024-12-13 ASSESSMENT — PATIENT HEALTH QUESTIONNAIRE - PHQ9
1. LITTLE INTEREST OR PLEASURE IN DOING THINGS: NOT AT ALL
2. FEELING DOWN, DEPRESSED OR HOPELESS: NOT AT ALL
SUM OF ALL RESPONSES TO PHQ9 QUESTIONS 1 AND 2: 0

## 2024-12-13 ASSESSMENT — ENCOUNTER SYMPTOMS
SHORTNESS OF BREATH: 0
WHEEZING: 0
ABDOMINAL PAIN: 0
COUGH: 0
NAUSEA: 0
CONSTIPATION: 0
DIARRHEA: 0
PALPITATIONS: 0

## 2024-12-13 ASSESSMENT — ACTIVITIES OF DAILY LIVING (ADL)
DOING_HOUSEWORK: INDEPENDENT
MANAGING_FINANCES: INDEPENDENT
BATHING: INDEPENDENT
GROCERY_SHOPPING: INDEPENDENT
DRESSING: INDEPENDENT
TAKING_MEDICATION: INDEPENDENT

## 2024-12-13 NOTE — PROGRESS NOTES
"Subjective   Patient ID: David Caldwell is a 79 y.o. male who presents for follow up and Medicare Annual Wellness Visit Subsequent.    Pain of left shoulder.  Waxes and wanes.  Worse with certain movements.  If has been getting a little better.  No known injury.    Otherwise feeling well.    No issues with CP, SOB or dizzy spells.  No Palpitations.    We reviewed and discussed his current medications as well as his most recent test results.      Review of Systems   Respiratory:  Negative for cough, shortness of breath and wheezing.    Cardiovascular:  Negative for chest pain and palpitations.   Gastrointestinal:  Negative for abdominal pain, constipation, diarrhea and nausea.       Objective   /68 (BP Location: Left arm, Patient Position: Sitting, BP Cuff Size: Adult)   Pulse 70   Temp 36.6 °C (97.9 °F) (Tympanic)   Resp 14   Ht 1.778 m (5' 10\")   Wt 82.1 kg (181 lb)   SpO2 98%   BMI 25.97 kg/m²     Physical Exam  Vitals reviewed.   Constitutional:       Appearance: Normal appearance.   HENT:      Head: Normocephalic.   Cardiovascular:      Rate and Rhythm: Normal rate.   Pulmonary:      Effort: Pulmonary effort is normal.   Musculoskeletal:         General: Tenderness present. No swelling. Normal range of motion.      Comments: Minimal tenderness.  + crepitus.    Neurological:      General: No focal deficit present.      Mental Status: He is alert.   Psychiatric:         Mood and Affect: Mood normal.         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    Cardiac issues are clinically stable.  Tolerating medications without issue.    HTN controlled.    Shoulder pain - seems consistent with OA.  We will proceed with XR.  Discussed topical Voltaren gel as well.  If symptoms persit we can consider steroid injection.   Continue other medications.    Annual Wellness Visit questions and answers were reviewed and discussed including the importance of discussing end of life wishes as well as having a living will and health care power of .     Follow up in 3-4 months - sooner if any issues.

## 2024-12-20 ENCOUNTER — LAB (OUTPATIENT)
Dept: LAB | Facility: LAB | Age: 79
End: 2024-12-20
Payer: MEDICARE

## 2024-12-20 DIAGNOSIS — N40.1 BENIGN PROSTATIC HYPERPLASIA WITH URINARY HESITANCY: ICD-10-CM

## 2024-12-20 DIAGNOSIS — E78.00 PURE HYPERCHOLESTEROLEMIA: ICD-10-CM

## 2024-12-20 DIAGNOSIS — E78.5 DYSLIPIDEMIA: ICD-10-CM

## 2024-12-20 DIAGNOSIS — Z12.5 SCREENING FOR PROSTATE CANCER: ICD-10-CM

## 2024-12-20 DIAGNOSIS — R39.11 BENIGN PROSTATIC HYPERPLASIA WITH URINARY HESITANCY: ICD-10-CM

## 2024-12-20 LAB
ALBUMIN SERPL BCP-MCNC: 3.9 G/DL (ref 3.4–5)
ALP SERPL-CCNC: 71 U/L (ref 33–136)
ALT SERPL W P-5'-P-CCNC: 13 U/L (ref 10–52)
ANION GAP SERPL CALC-SCNC: 15 MMOL/L (ref 10–20)
APPEARANCE UR: CLEAR
AST SERPL W P-5'-P-CCNC: 20 U/L (ref 9–39)
BILIRUB SERPL-MCNC: 1.2 MG/DL (ref 0–1.2)
BILIRUB UR STRIP.AUTO-MCNC: NEGATIVE MG/DL
BUN SERPL-MCNC: 17 MG/DL (ref 6–23)
CALCIUM SERPL-MCNC: 9.3 MG/DL (ref 8.6–10.6)
CHLORIDE SERPL-SCNC: 104 MMOL/L (ref 98–107)
CHOLEST SERPL-MCNC: 183 MG/DL (ref 0–199)
CHOLESTEROL/HDL RATIO: 3.9
CO2 SERPL-SCNC: 26 MMOL/L (ref 21–32)
COLOR UR: YELLOW
CREAT SERPL-MCNC: 1 MG/DL (ref 0.5–1.3)
EGFRCR SERPLBLD CKD-EPI 2021: 76 ML/MIN/1.73M*2
ERYTHROCYTE [DISTWIDTH] IN BLOOD BY AUTOMATED COUNT: 14.9 % (ref 11.5–14.5)
GLUCOSE SERPL-MCNC: 90 MG/DL (ref 74–99)
GLUCOSE UR STRIP.AUTO-MCNC: NORMAL MG/DL
HCT VFR BLD AUTO: 40.6 % (ref 41–52)
HDLC SERPL-MCNC: 47.2 MG/DL
HGB BLD-MCNC: 12.9 G/DL (ref 13.5–17.5)
KETONES UR STRIP.AUTO-MCNC: NEGATIVE MG/DL
LDLC SERPL CALC-MCNC: 121 MG/DL
LEUKOCYTE ESTERASE UR QL STRIP.AUTO: NEGATIVE
MCH RBC QN AUTO: 29.2 PG (ref 26–34)
MCHC RBC AUTO-ENTMCNC: 31.8 G/DL (ref 32–36)
MCV RBC AUTO: 92 FL (ref 80–100)
MUCOUS THREADS #/AREA URNS AUTO: NORMAL /LPF
NITRITE UR QL STRIP.AUTO: NEGATIVE
NON HDL CHOLESTEROL: 136 MG/DL (ref 0–149)
NRBC BLD-RTO: 0 /100 WBCS (ref 0–0)
PH UR STRIP.AUTO: 6.5 [PH]
PLATELET # BLD AUTO: 192 X10*3/UL (ref 150–450)
POTASSIUM SERPL-SCNC: 4.4 MMOL/L (ref 3.5–5.3)
PROT SERPL-MCNC: 7.3 G/DL (ref 6.4–8.2)
PROT UR STRIP.AUTO-MCNC: ABNORMAL MG/DL
PSA SERPL-MCNC: 1.29 NG/ML
RBC # BLD AUTO: 4.42 X10*6/UL (ref 4.5–5.9)
RBC # UR STRIP.AUTO: NEGATIVE /UL
RBC #/AREA URNS AUTO: NORMAL /HPF
SODIUM SERPL-SCNC: 141 MMOL/L (ref 136–145)
SP GR UR STRIP.AUTO: 1.02
TRIGL SERPL-MCNC: 73 MG/DL (ref 0–149)
TSH SERPL-ACNC: 3.26 MIU/L (ref 0.44–3.98)
UROBILINOGEN UR STRIP.AUTO-MCNC: ABNORMAL MG/DL
VLDL: 15 MG/DL (ref 0–40)
WBC # BLD AUTO: 4.6 X10*3/UL (ref 4.4–11.3)
WBC #/AREA URNS AUTO: NORMAL /HPF

## 2024-12-20 PROCEDURE — 80061 LIPID PANEL: CPT

## 2024-12-20 PROCEDURE — 85027 COMPLETE CBC AUTOMATED: CPT

## 2024-12-20 PROCEDURE — G0103 PSA SCREENING: HCPCS

## 2024-12-20 PROCEDURE — 84443 ASSAY THYROID STIM HORMONE: CPT

## 2024-12-20 PROCEDURE — 80053 COMPREHEN METABOLIC PANEL: CPT

## 2024-12-20 PROCEDURE — 81001 URINALYSIS AUTO W/SCOPE: CPT

## 2024-12-20 PROCEDURE — 36415 COLL VENOUS BLD VENIPUNCTURE: CPT

## 2024-12-26 ENCOUNTER — APPOINTMENT (OUTPATIENT)
Dept: LAB | Facility: LAB | Age: 79
End: 2024-12-26
Payer: MEDICARE

## 2024-12-27 ENCOUNTER — TELEPHONE (OUTPATIENT)
Dept: PRIMARY CARE | Facility: CLINIC | Age: 79
End: 2024-12-27
Payer: MEDICARE

## 2024-12-27 NOTE — TELEPHONE ENCOUNTER
----- Message from Mason Interiano sent at 12/24/2024 10:15 AM EST -----  His blood tests and urine tests all look good.  No changes needed based on these.

## 2025-01-06 DIAGNOSIS — E78.5 DYSLIPIDEMIA: Primary | ICD-10-CM

## 2025-01-06 RX ORDER — BEMPEDOIC ACID 180 MG/1
180 TABLET, FILM COATED ORAL NIGHTLY
Qty: 30 TABLET | Refills: 11 | Status: SHIPPED | OUTPATIENT
Start: 2025-01-06 | End: 2025-01-10 | Stop reason: WASHOUT

## 2025-01-06 NOTE — PROGRESS NOTES
Patient with coronary disease s/p PCI and intolerant to several statins.  Tolerating Zetia 10 mg daily however LDL still greater than 70.  Will put a prescription for Nexletol to the specialty pharmacy to see if it can be approved.

## 2025-01-07 ENCOUNTER — SPECIALTY PHARMACY (OUTPATIENT)
Dept: PHARMACY | Facility: CLINIC | Age: 80
End: 2025-01-07

## 2025-01-09 ENCOUNTER — HOSPITAL ENCOUNTER (EMERGENCY)
Facility: HOSPITAL | Age: 80
Discharge: HOME | End: 2025-01-09
Attending: EMERGENCY MEDICINE
Payer: MEDICARE

## 2025-01-09 ENCOUNTER — APPOINTMENT (OUTPATIENT)
Dept: RADIOLOGY | Facility: HOSPITAL | Age: 80
End: 2025-01-09
Payer: MEDICARE

## 2025-01-09 ENCOUNTER — HOSPITAL ENCOUNTER (OUTPATIENT)
Dept: CARDIOLOGY | Facility: HOSPITAL | Age: 80
Discharge: HOME | End: 2025-01-09
Payer: MEDICARE

## 2025-01-09 VITALS
HEART RATE: 70 BPM | DIASTOLIC BLOOD PRESSURE: 71 MMHG | OXYGEN SATURATION: 97 % | SYSTOLIC BLOOD PRESSURE: 150 MMHG | HEIGHT: 70 IN | RESPIRATION RATE: 20 BRPM | TEMPERATURE: 98.4 F | BODY MASS INDEX: 24.93 KG/M2 | WEIGHT: 174.16 LBS

## 2025-01-09 DIAGNOSIS — N20.0 NEPHROLITHIASIS: ICD-10-CM

## 2025-01-09 DIAGNOSIS — N23 RENAL COLIC ON RIGHT SIDE: Primary | ICD-10-CM

## 2025-01-09 LAB
ALBUMIN SERPL BCP-MCNC: 4 G/DL (ref 3.4–5)
ALP SERPL-CCNC: 73 U/L (ref 33–136)
ALT SERPL W P-5'-P-CCNC: 10 U/L (ref 10–52)
ANION GAP SERPL CALC-SCNC: 13 MMOL/L (ref 10–20)
APPEARANCE UR: CLEAR
AST SERPL W P-5'-P-CCNC: 18 U/L (ref 9–39)
BASOPHILS # BLD AUTO: 0.03 X10*3/UL (ref 0–0.1)
BASOPHILS NFR BLD AUTO: 0.3 %
BILIRUB SERPL-MCNC: 1.5 MG/DL (ref 0–1.2)
BILIRUB UR STRIP.AUTO-MCNC: NEGATIVE MG/DL
BUN SERPL-MCNC: 17 MG/DL (ref 6–23)
CALCIUM SERPL-MCNC: 9.4 MG/DL (ref 8.6–10.3)
CAOX CRY #/AREA UR COMP ASSIST: ABNORMAL /HPF
CHLORIDE SERPL-SCNC: 101 MMOL/L (ref 98–107)
CO2 SERPL-SCNC: 26 MMOL/L (ref 21–32)
COLOR UR: ABNORMAL
CREAT SERPL-MCNC: 1.37 MG/DL (ref 0.5–1.3)
EGFRCR SERPLBLD CKD-EPI 2021: 52 ML/MIN/1.73M*2
EOSINOPHIL # BLD AUTO: 0.04 X10*3/UL (ref 0–0.4)
EOSINOPHIL NFR BLD AUTO: 0.4 %
ERYTHROCYTE [DISTWIDTH] IN BLOOD BY AUTOMATED COUNT: 14.7 % (ref 11.5–14.5)
GLUCOSE SERPL-MCNC: 102 MG/DL (ref 74–99)
GLUCOSE UR STRIP.AUTO-MCNC: NORMAL MG/DL
HCT VFR BLD AUTO: 41.1 % (ref 41–52)
HGB BLD-MCNC: 13.3 G/DL (ref 13.5–17.5)
HOLD SPECIMEN: NORMAL
IMM GRANULOCYTES # BLD AUTO: 0.03 X10*3/UL (ref 0–0.5)
IMM GRANULOCYTES NFR BLD AUTO: 0.3 % (ref 0–0.9)
KETONES UR STRIP.AUTO-MCNC: ABNORMAL MG/DL
LEUKOCYTE ESTERASE UR QL STRIP.AUTO: NEGATIVE
LYMPHOCYTES # BLD AUTO: 0.34 X10*3/UL (ref 0.8–3)
LYMPHOCYTES NFR BLD AUTO: 3.2 %
MCH RBC QN AUTO: 29.4 PG (ref 26–34)
MCHC RBC AUTO-ENTMCNC: 32.4 G/DL (ref 32–36)
MCV RBC AUTO: 91 FL (ref 80–100)
MONOCYTES # BLD AUTO: 0.91 X10*3/UL (ref 0.05–0.8)
MONOCYTES NFR BLD AUTO: 8.5 %
MUCOUS THREADS #/AREA URNS AUTO: ABNORMAL /LPF
NEUTROPHILS # BLD AUTO: 9.32 X10*3/UL (ref 1.6–5.5)
NEUTROPHILS NFR BLD AUTO: 87.3 %
NITRITE UR QL STRIP.AUTO: NEGATIVE
NRBC BLD-RTO: 0 /100 WBCS (ref 0–0)
PH UR STRIP.AUTO: 7.5 [PH]
PLATELET # BLD AUTO: 204 X10*3/UL (ref 150–450)
POTASSIUM SERPL-SCNC: 4 MMOL/L (ref 3.5–5.3)
PROT SERPL-MCNC: 7.9 G/DL (ref 6.4–8.2)
PROT UR STRIP.AUTO-MCNC: ABNORMAL MG/DL
RBC # BLD AUTO: 4.53 X10*6/UL (ref 4.5–5.9)
RBC # UR STRIP.AUTO: ABNORMAL /UL
RBC #/AREA URNS AUTO: >20 /HPF
SODIUM SERPL-SCNC: 136 MMOL/L (ref 136–145)
SP GR UR STRIP.AUTO: 1.01
UROBILINOGEN UR STRIP.AUTO-MCNC: ABNORMAL MG/DL
WBC # BLD AUTO: 10.7 X10*3/UL (ref 4.4–11.3)
WBC #/AREA URNS AUTO: ABNORMAL /HPF

## 2025-01-09 PROCEDURE — 81001 URINALYSIS AUTO W/SCOPE: CPT

## 2025-01-09 PROCEDURE — 96374 THER/PROPH/DIAG INJ IV PUSH: CPT

## 2025-01-09 PROCEDURE — 96375 TX/PRO/DX INJ NEW DRUG ADDON: CPT

## 2025-01-09 PROCEDURE — 74177 CT ABD & PELVIS W/CONTRAST: CPT

## 2025-01-09 PROCEDURE — 2550000001 HC RX 255 CONTRASTS: Performed by: EMERGENCY MEDICINE

## 2025-01-09 PROCEDURE — 36415 COLL VENOUS BLD VENIPUNCTURE: CPT

## 2025-01-09 PROCEDURE — 80053 COMPREHEN METABOLIC PANEL: CPT

## 2025-01-09 PROCEDURE — 85025 COMPLETE CBC W/AUTO DIFF WBC: CPT

## 2025-01-09 PROCEDURE — 74177 CT ABD & PELVIS W/CONTRAST: CPT | Performed by: RADIOLOGY

## 2025-01-09 PROCEDURE — 93005 ELECTROCARDIOGRAM TRACING: CPT

## 2025-01-09 PROCEDURE — 99285 EMERGENCY DEPT VISIT HI MDM: CPT | Mod: 25 | Performed by: EMERGENCY MEDICINE

## 2025-01-09 PROCEDURE — 99285 EMERGENCY DEPT VISIT HI MDM: CPT

## 2025-01-09 PROCEDURE — 96376 TX/PRO/DX INJ SAME DRUG ADON: CPT

## 2025-01-09 PROCEDURE — 2500000004 HC RX 250 GENERAL PHARMACY W/ HCPCS (ALT 636 FOR OP/ED)

## 2025-01-09 RX ORDER — KETOROLAC TROMETHAMINE 15 MG/ML
15 INJECTION, SOLUTION INTRAMUSCULAR; INTRAVENOUS ONCE
Status: COMPLETED | OUTPATIENT
Start: 2025-01-09 | End: 2025-01-09

## 2025-01-09 RX ORDER — TAMSULOSIN HYDROCHLORIDE 0.4 MG/1
0.4 CAPSULE ORAL DAILY
Qty: 14 CAPSULE | Refills: 0 | Status: SHIPPED | OUTPATIENT
Start: 2025-01-09 | End: 2025-01-23

## 2025-01-09 RX ORDER — OXYCODONE AND ACETAMINOPHEN 5; 325 MG/1; MG/1
1 TABLET ORAL EVERY 6 HOURS PRN
Qty: 5 TABLET | Refills: 0 | Status: SHIPPED | OUTPATIENT
Start: 2025-01-09 | End: 2025-01-12

## 2025-01-09 RX ORDER — MELOXICAM 7.5 MG/1
15 TABLET ORAL DAILY
Qty: 10 TABLET | Refills: 0 | Status: SHIPPED | OUTPATIENT
Start: 2025-01-09 | End: 2025-01-14

## 2025-01-09 RX ORDER — ONDANSETRON HYDROCHLORIDE 2 MG/ML
4 INJECTION, SOLUTION INTRAVENOUS ONCE
Status: COMPLETED | OUTPATIENT
Start: 2025-01-09 | End: 2025-01-09

## 2025-01-09 RX ORDER — ONDANSETRON 4 MG/1
4 TABLET, FILM COATED ORAL EVERY 6 HOURS
Qty: 12 TABLET | Refills: 0 | Status: SHIPPED | OUTPATIENT
Start: 2025-01-09 | End: 2025-01-12

## 2025-01-09 RX ADMIN — ONDANSETRON 4 MG: 2 INJECTION INTRAMUSCULAR; INTRAVENOUS at 09:13

## 2025-01-09 RX ADMIN — IOHEXOL 75 ML: 350 INJECTION, SOLUTION INTRAVENOUS at 10:25

## 2025-01-09 RX ADMIN — KETOROLAC TROMETHAMINE 15 MG: 15 INJECTION, SOLUTION INTRAMUSCULAR; INTRAVENOUS at 12:19

## 2025-01-09 RX ADMIN — KETOROLAC TROMETHAMINE 15 MG: 15 INJECTION, SOLUTION INTRAMUSCULAR; INTRAVENOUS at 09:14

## 2025-01-09 ASSESSMENT — COLUMBIA-SUICIDE SEVERITY RATING SCALE - C-SSRS
1. IN THE PAST MONTH, HAVE YOU WISHED YOU WERE DEAD OR WISHED YOU COULD GO TO SLEEP AND NOT WAKE UP?: NO
2. HAVE YOU ACTUALLY HAD ANY THOUGHTS OF KILLING YOURSELF?: NO
6. HAVE YOU EVER DONE ANYTHING, STARTED TO DO ANYTHING, OR PREPARED TO DO ANYTHING TO END YOUR LIFE?: NO

## 2025-01-09 ASSESSMENT — PAIN SCALES - GENERAL
PAINLEVEL_OUTOF10: 0 - NO PAIN
PAINLEVEL_OUTOF10: 10 - WORST POSSIBLE PAIN
PAINLEVEL_OUTOF10: 5 - MODERATE PAIN
PAINLEVEL_OUTOF10: 10 - WORST POSSIBLE PAIN

## 2025-01-09 ASSESSMENT — PAIN DESCRIPTION - LOCATION
LOCATION: BACK
LOCATION: BACK

## 2025-01-09 ASSESSMENT — PAIN DESCRIPTION - DESCRIPTORS: DESCRIPTORS: SHARP

## 2025-01-09 ASSESSMENT — LIFESTYLE VARIABLES
EVER HAD A DRINK FIRST THING IN THE MORNING TO STEADY YOUR NERVES TO GET RID OF A HANGOVER: NO
TOTAL SCORE: 0
EVER FELT BAD OR GUILTY ABOUT YOUR DRINKING: NO
HAVE YOU EVER FELT YOU SHOULD CUT DOWN ON YOUR DRINKING: NO
HAVE PEOPLE ANNOYED YOU BY CRITICIZING YOUR DRINKING: NO

## 2025-01-09 ASSESSMENT — PAIN DESCRIPTION - ORIENTATION
ORIENTATION: RIGHT
ORIENTATION: RIGHT

## 2025-01-09 ASSESSMENT — PAIN - FUNCTIONAL ASSESSMENT
PAIN_FUNCTIONAL_ASSESSMENT: 0-10
PAIN_FUNCTIONAL_ASSESSMENT: 0-10

## 2025-01-09 ASSESSMENT — PAIN DESCRIPTION - PAIN TYPE
TYPE: ACUTE PAIN
TYPE: ACUTE PAIN

## 2025-01-09 ASSESSMENT — PAIN DESCRIPTION - ONSET: ONSET: PROGRESSIVE

## 2025-01-09 ASSESSMENT — PAIN DESCRIPTION - FREQUENCY: FREQUENCY: CONSTANT/CONTINUOUS

## 2025-01-09 NOTE — ED PROVIDER NOTES
History of Present Illness       History provided by: Patient  Limitations to History: None    HPI:  HPI   This is an 79-year-old male with a history of myasthenia gravis, TIA, MI, atrial fibrillation on Eliquis, presenting to the ED with right-sided flank pain radiating into his groin for the last 2 days.  He has a history of kidney stones and reports this feels very similar. He rates his pain 10/10.  He also endorses nausea, vomiting, constipation. He took a Stonebreaker supplement at home yesterday which did not alleviate his symptoms. He denies CP, SOB, fevers, chills.       Physical Exam   Physical Exam  Constitutional:       General: He is not in acute distress.     Appearance: Normal appearance. He is not ill-appearing.   HENT:      Head: Normocephalic and atraumatic.      Nose: Nose normal.      Mouth/Throat:      Mouth: Mucous membranes are moist.   Eyes:      Extraocular Movements: Extraocular movements intact.   Cardiovascular:      Rate and Rhythm: Normal rate. Rhythm irregular.   Pulmonary:      Effort: Pulmonary effort is normal.      Breath sounds: Normal breath sounds.   Abdominal:      General: Abdomen is flat. Bowel sounds are normal. There is no distension.      Palpations: Abdomen is soft.      Tenderness: There is no abdominal tenderness. There is right CVA tenderness. There is no left CVA tenderness or guarding.   Musculoskeletal:         General: Normal range of motion.      Cervical back: Normal range of motion and neck supple.   Skin:     General: Skin is warm.      Findings: No bruising.   Neurological:      General: No focal deficit present.      Mental Status: He is alert.          Triage vitals:  T 36.9 °C (98.4 °F)  HR 66  /80  RR (!) 22  O2 100 % None (Room air)        Medical Decision Making & ED Course     ED Course & MDM       Medical Decision Making:  Medical Decision Making  This is an 79-year-old male history of myasthenia gravis, TIA, MI, atrial fibrillation on  "Eliquis, nephrolithiasis, presenting to the ED with right-sided low back pain, flank pain, radiating into his groin.  History obtained by patient and chart.  Patient reports this feels very similar to his previous kidney stones, although pain is worse than previous stones.  He took a Stonebraker at home yesterday which did not alleviate his symptoms.  He endorses nausea, vomiting, reports 3 episodes of emesis.  Also endorses constipation over the last 2 weeks after the holidays.  Due to being on Eliquis with low back pain, he will get a CT abdomen and pelvis to rule out more serious diagnoses including retroperitoneal hematoma.  Labs including CBC, CMP, UA.  Given Toradol and Zofran which has alleviated his pain upon reassessment.     CBC WNL, low GFR. Low Hgb at 13.3, higher than his baseline. UA abnormal with 2+ blood, micro >20 RBC.    CT A/P with contrast ordered. Shows a right obstructive uropathy with mild right-sided hydronephrosis and hydroureter with 6 mm stone in the distal right ureter with surrounding stranding of the periureteral fat along the right lateral pelvic wall.  Will call urology, patient follows with [facility]. Urology recommends flomax, pain control, follow up in 1 week. Does not need antibiotics as his WBC not elevated, no fever or signs of fever. Return precautions given to patient, patient also sent prescriptions for Percocet, Zofran, Flomax, patient expresses concern for taking Percocet and reports he is unable to with his myasthenia gravis.  This was discussed with pharmacist and attending, both say that this is not a contraindication to myasthenia gravis and is safe to take. The patient was informed and able to answer all questions. Discharged in stable condition     Patient was discussed and staffed with Dr Crandall   ----    Visit Vitals  /71   Pulse 70   Temp 36.9 °C (98.4 °F) (Tympanic)   Resp 20   Ht 1.778 m (5' 10\")   Wt 79 kg (174 lb 2.6 oz)   SpO2 97%   BMI 24.99 kg/m² "   Smoking Status Never   BSA 1.98 m²        Labs Reviewed   CBC WITH AUTO DIFFERENTIAL - Abnormal       Result Value    WBC 10.7      nRBC 0.0      RBC 4.53      Hemoglobin 13.3 (*)     Hematocrit 41.1      MCV 91      MCH 29.4      MCHC 32.4      RDW 14.7 (*)     Platelets 204      Neutrophils % 87.3      Immature Granulocytes %, Automated 0.3      Lymphocytes % 3.2      Monocytes % 8.5      Eosinophils % 0.4      Basophils % 0.3      Neutrophils Absolute 9.32 (*)     Immature Granulocytes Absolute, Automated 0.03      Lymphocytes Absolute 0.34 (*)     Monocytes Absolute 0.91 (*)     Eosinophils Absolute 0.04      Basophils Absolute 0.03     COMPREHENSIVE METABOLIC PANEL - Abnormal    Glucose 102 (*)     Sodium 136      Potassium 4.0      Chloride 101      Bicarbonate 26      Anion Gap 13      Urea Nitrogen 17      Creatinine 1.37 (*)     eGFR 52 (*)     Calcium 9.4      Albumin 4.0      Alkaline Phosphatase 73      Total Protein 7.9      AST 18      Bilirubin, Total 1.5 (*)     ALT 10     URINALYSIS WITH REFLEX CULTURE AND MICROSCOPIC - Abnormal    Color, Urine Light-Orange (*)     Appearance, Urine Clear      Specific Gravity, Urine 1.015      pH, Urine 7.5      Protein, Urine 10 (TRACE)      Glucose, Urine Normal      Blood, Urine 0.2 (2+) (*)     Ketones, Urine 20 (1+) (*)     Bilirubin, Urine NEGATIVE      Urobilinogen, Urine 4 (2+) (*)     Nitrite, Urine NEGATIVE      Leukocyte Esterase, Urine NEGATIVE     URINALYSIS MICROSCOPIC WITH REFLEX CULTURE - Abnormal    WBC, Urine 1-5      RBC, Urine >20 (*)     Mucus, Urine FEW      Calcium Oxalate Crystals, Urine 1+     URINALYSIS WITH REFLEX CULTURE AND MICROSCOPIC    Narrative:     The following orders were created for panel order Urinalysis with Reflex Culture and Microscopic.  Procedure                               Abnormality         Status                     ---------                               -----------         ------                     Urinalysis  with Reflex C...[[id number]]  Abnormal            Final result               Extra Urine Gray Tube[[id number]]                            In process                   Please view results for these tests on the individual orders.   EXTRA URINE GRAY TUBE       CT abdomen pelvis w IV contrast   Final Result   1. Right obstructive uropathy with mild right-sided hydronephrosis   and hydroureter with 6 mm stone in the distal right ureter with   surrounding stranding of the periureteral fat along the right lateral   pelvic wall.        2. Nodular lesion within the midline mesentery measuring 1.8 cm with   the adjacent nodular lesion. The distal most nodule is more   conspicuous from the prior examination with the distal most nodular   lesion measuring 1.5 x 1.8 cm and on the prior examination measured   1.0 x 1.8 cm. Correlate with prior workup. No surrounding   desmoplastic reaction about the lesion. Possibility of carcinoid   tumor is not excluded.        3. Nodular lesion within the left lobe of the liver segment 2   intervally increased in size measuring 2.6 cm in the prior exam   measured 1.1 cm. Follow-up ultrasound to confirm cystic/solid etiology                       MACRO:   None        Signed by: Nas Fernández 1/9/2025 11:20 AM   Dictation workstation:   [id number]          ED Course:  Diagnoses as of 01/09/25 1305   Renal colic on right side   Nephrolithiasis     Disposition   Discharge home in stable condition.  Discussed with urology, encouraged to follow-up in 1 week.  Given Flomax, Zofran, pain medication. Left ED in stable condition    Procedures   Procedures    Kaylah Overton PA-C  Emergency Medicine      Kaylah Overton PA-C  01/09/25 1306

## 2025-01-11 LAB
ATRIAL RATE: 66 BPM
Q ONSET: 226 MS
QRS COUNT: 10 BEATS
QRS DURATION: 102 MS
QT INTERVAL: 516 MS
QTC CALCULATION(BAZETT): 523 MS
QTC FREDERICIA: 522 MS
R AXIS: 34 DEGREES
T AXIS: 73 DEGREES
T OFFSET: 484 MS
VENTRICULAR RATE: 62 BPM

## 2025-01-13 ENCOUNTER — TELEPHONE (OUTPATIENT)
Dept: CARDIOLOGY | Facility: CLINIC | Age: 80
End: 2025-01-13
Payer: MEDICARE

## 2025-01-13 DIAGNOSIS — I48.21 PERMANENT ATRIAL FIBRILLATION (MULTI): Primary | ICD-10-CM

## 2025-01-13 RX ORDER — RIVAROXABAN 20 MG/1
20 TABLET, FILM COATED ORAL
Qty: 90 TABLET | Refills: 3 | Status: SHIPPED | OUTPATIENT
Start: 2025-01-13

## 2025-01-13 NOTE — TELEPHONE ENCOUNTER
Pt needs refill of Xarelto to be sent to [facility] in [place].      Thanks.        (Pt states Dr. Arredondo gave him rx for Eliquis, but he wants to stay with Xarelto and Dr. Alegre).

## 2025-01-13 NOTE — TELEPHONE ENCOUNTER
Spoke to patient. Going forward, he is only going to see Dr. Alegre for his cardiac care. He has not filled the Eliquis, and verbalized understanding that he can not take both Eliquis and Xarelto.

## 2025-01-14 ENCOUNTER — HOSPITAL ENCOUNTER (OUTPATIENT)
Dept: RADIOLOGY | Facility: HOSPITAL | Age: 80
Discharge: HOME | End: 2025-01-14
Payer: MEDICARE

## 2025-01-14 ENCOUNTER — LAB (OUTPATIENT)
Dept: LAB | Facility: LAB | Age: 80
End: 2025-01-14
Payer: MEDICARE

## 2025-01-14 DIAGNOSIS — N20.1 CALCULUS OF URETER: ICD-10-CM

## 2025-01-14 DIAGNOSIS — N20.1 CALCULUS OF URETER: Primary | ICD-10-CM

## 2025-01-14 LAB
ANION GAP SERPL CALC-SCNC: 14 MMOL/L (ref 10–20)
BUN SERPL-MCNC: 17 MG/DL (ref 6–23)
CALCIUM SERPL-MCNC: 9.4 MG/DL (ref 8.6–10.3)
CHLORIDE SERPL-SCNC: 100 MMOL/L (ref 98–107)
CO2 SERPL-SCNC: 27 MMOL/L (ref 21–32)
CREAT SERPL-MCNC: 1.79 MG/DL (ref 0.5–1.3)
EGFRCR SERPLBLD CKD-EPI 2021: 38 ML/MIN/1.73M*2
GLUCOSE SERPL-MCNC: 89 MG/DL (ref 74–99)
POTASSIUM SERPL-SCNC: 4.5 MMOL/L (ref 3.5–5.3)
SODIUM SERPL-SCNC: 136 MMOL/L (ref 136–145)

## 2025-01-14 PROCEDURE — 74018 RADEX ABDOMEN 1 VIEW: CPT | Performed by: RADIOLOGY

## 2025-01-14 PROCEDURE — 74018 RADEX ABDOMEN 1 VIEW: CPT

## 2025-01-17 ENCOUNTER — ANESTHESIA (OUTPATIENT)
Dept: OPERATING ROOM | Facility: HOSPITAL | Age: 80
End: 2025-01-17
Payer: MEDICARE

## 2025-01-17 ENCOUNTER — HOSPITAL ENCOUNTER (OUTPATIENT)
Facility: HOSPITAL | Age: 80
Setting detail: OUTPATIENT SURGERY
Discharge: HOME | End: 2025-01-17
Attending: UROLOGY | Admitting: UROLOGY
Payer: MEDICARE

## 2025-01-17 ENCOUNTER — APPOINTMENT (OUTPATIENT)
Dept: RADIOLOGY | Facility: HOSPITAL | Age: 80
End: 2025-01-17
Payer: MEDICARE

## 2025-01-17 ENCOUNTER — ANESTHESIA EVENT (OUTPATIENT)
Dept: OPERATING ROOM | Facility: HOSPITAL | Age: 80
End: 2025-01-17
Payer: MEDICARE

## 2025-01-17 VITALS
BODY MASS INDEX: 25.05 KG/M2 | RESPIRATION RATE: 16 BRPM | DIASTOLIC BLOOD PRESSURE: 65 MMHG | HEIGHT: 70 IN | HEART RATE: 62 BPM | SYSTOLIC BLOOD PRESSURE: 138 MMHG | OXYGEN SATURATION: 100 % | TEMPERATURE: 97.3 F | WEIGHT: 175 LBS

## 2025-01-17 DIAGNOSIS — N20.1 CALCULUS OF URETER: ICD-10-CM

## 2025-01-17 PROCEDURE — 2500000004 HC RX 250 GENERAL PHARMACY W/ HCPCS (ALT 636 FOR OP/ED): Performed by: NURSE ANESTHETIST, CERTIFIED REGISTERED

## 2025-01-17 PROCEDURE — 2500000005 HC RX 250 GENERAL PHARMACY W/O HCPCS: Performed by: NURSE ANESTHETIST, CERTIFIED REGISTERED

## 2025-01-17 PROCEDURE — C2617 STENT, NON-COR, TEM W/O DEL: HCPCS | Performed by: UROLOGY

## 2025-01-17 PROCEDURE — 2720000007 HC OR 272 NO HCPCS: Performed by: UROLOGY

## 2025-01-17 PROCEDURE — 2780000003 HC OR 278 NO HCPCS: Performed by: UROLOGY

## 2025-01-17 PROCEDURE — 2500000004 HC RX 250 GENERAL PHARMACY W/ HCPCS (ALT 636 FOR OP/ED): Performed by: UROLOGY

## 2025-01-17 PROCEDURE — 2500000004 HC RX 250 GENERAL PHARMACY W/ HCPCS (ALT 636 FOR OP/ED): Performed by: ANESTHESIOLOGY

## 2025-01-17 PROCEDURE — 3600000008 HC OR TIME - EACH INCREMENTAL 1 MINUTE - PROCEDURE LEVEL THREE: Performed by: UROLOGY

## 2025-01-17 PROCEDURE — C1769 GUIDE WIRE: HCPCS | Performed by: UROLOGY

## 2025-01-17 PROCEDURE — 7100000009 HC PHASE TWO TIME - INITIAL BASE CHARGE: Performed by: UROLOGY

## 2025-01-17 PROCEDURE — 7100000001 HC RECOVERY ROOM TIME - INITIAL BASE CHARGE: Performed by: UROLOGY

## 2025-01-17 PROCEDURE — 7100000010 HC PHASE TWO TIME - EACH INCREMENTAL 1 MINUTE: Performed by: UROLOGY

## 2025-01-17 PROCEDURE — 82365 CALCULUS SPECTROSCOPY: CPT | Performed by: UROLOGY

## 2025-01-17 PROCEDURE — 3700000001 HC GENERAL ANESTHESIA TIME - INITIAL BASE CHARGE: Performed by: UROLOGY

## 2025-01-17 PROCEDURE — 7100000002 HC RECOVERY ROOM TIME - EACH INCREMENTAL 1 MINUTE: Performed by: UROLOGY

## 2025-01-17 PROCEDURE — 3600000003 HC OR TIME - INITIAL BASE CHARGE - PROCEDURE LEVEL THREE: Performed by: UROLOGY

## 2025-01-17 PROCEDURE — 3700000002 HC GENERAL ANESTHESIA TIME - EACH INCREMENTAL 1 MINUTE: Performed by: UROLOGY

## 2025-01-17 DEVICE — URETERAL STENT
Type: IMPLANTABLE DEVICE | Site: URETER | Status: FUNCTIONAL
Brand: CONTOUR™

## 2025-01-17 RX ORDER — CIPROFLOXACIN 2 MG/ML
400 INJECTION, SOLUTION INTRAVENOUS ONCE
Status: COMPLETED | OUTPATIENT
Start: 2025-01-17 | End: 2025-01-17

## 2025-01-17 RX ORDER — ACETAMINOPHEN 325 MG/1
975 TABLET ORAL ONCE
Status: DISCONTINUED | OUTPATIENT
Start: 2025-01-17 | End: 2025-01-17 | Stop reason: HOSPADM

## 2025-01-17 RX ORDER — LIDOCAINE HYDROCHLORIDE 20 MG/ML
INJECTION, SOLUTION EPIDURAL; INFILTRATION; INTRACAUDAL; PERINEURAL AS NEEDED
Status: DISCONTINUED | OUTPATIENT
Start: 2025-01-17 | End: 2025-01-17

## 2025-01-17 RX ORDER — FENTANYL CITRATE 50 UG/ML
INJECTION, SOLUTION INTRAMUSCULAR; INTRAVENOUS CONTINUOUS PRN
Status: DISCONTINUED | OUTPATIENT
Start: 2025-01-17 | End: 2025-01-17

## 2025-01-17 RX ORDER — HYDROMORPHONE HYDROCHLORIDE 0.2 MG/ML
0.2 INJECTION INTRAMUSCULAR; INTRAVENOUS; SUBCUTANEOUS EVERY 5 MIN PRN
Status: DISCONTINUED | OUTPATIENT
Start: 2025-01-17 | End: 2025-01-17 | Stop reason: HOSPADM

## 2025-01-17 RX ORDER — PHENYLEPHRINE HCL IN 0.9% NACL 1 MG/10 ML
SYRINGE (ML) INTRAVENOUS AS NEEDED
Status: DISCONTINUED | OUTPATIENT
Start: 2025-01-17 | End: 2025-01-17

## 2025-01-17 RX ORDER — SODIUM CHLORIDE, SODIUM LACTATE, POTASSIUM CHLORIDE, CALCIUM CHLORIDE 600; 310; 30; 20 MG/100ML; MG/100ML; MG/100ML; MG/100ML
125 INJECTION, SOLUTION INTRAVENOUS CONTINUOUS
Status: ACTIVE | OUTPATIENT
Start: 2025-01-17 | End: 2025-01-17

## 2025-01-17 RX ORDER — ONDANSETRON HYDROCHLORIDE 2 MG/ML
4 INJECTION, SOLUTION INTRAVENOUS ONCE AS NEEDED
Status: DISCONTINUED | OUTPATIENT
Start: 2025-01-17 | End: 2025-01-17 | Stop reason: HOSPADM

## 2025-01-17 RX ORDER — ONDANSETRON HYDROCHLORIDE 2 MG/ML
INJECTION, SOLUTION INTRAVENOUS AS NEEDED
Status: DISCONTINUED | OUTPATIENT
Start: 2025-01-17 | End: 2025-01-17

## 2025-01-17 RX ORDER — CIPROFLOXACIN 500 MG/1
500 TABLET ORAL 2 TIMES DAILY
Qty: 6 TABLET | Refills: 0 | Status: SHIPPED | OUTPATIENT
Start: 2025-01-17 | End: 2025-01-20

## 2025-01-17 RX ORDER — HYDROMORPHONE HYDROCHLORIDE 0.2 MG/ML
0.1 INJECTION INTRAMUSCULAR; INTRAVENOUS; SUBCUTANEOUS EVERY 5 MIN PRN
Status: DISCONTINUED | OUTPATIENT
Start: 2025-01-17 | End: 2025-01-17 | Stop reason: HOSPADM

## 2025-01-17 RX ORDER — ACETAMINOPHEN 325 MG/1
650 TABLET ORAL EVERY 4 HOURS PRN
Status: DISCONTINUED | OUTPATIENT
Start: 2025-01-17 | End: 2025-01-17 | Stop reason: HOSPADM

## 2025-01-17 RX ORDER — ALBUTEROL SULFATE 0.83 MG/ML
2.5 SOLUTION RESPIRATORY (INHALATION) ONCE AS NEEDED
Status: DISCONTINUED | OUTPATIENT
Start: 2025-01-17 | End: 2025-01-17 | Stop reason: HOSPADM

## 2025-01-17 RX ORDER — PROPOFOL 10 MG/ML
INJECTION, EMULSION INTRAVENOUS AS NEEDED
Status: DISCONTINUED | OUTPATIENT
Start: 2025-01-17 | End: 2025-01-17

## 2025-01-17 RX ORDER — HYDRALAZINE HYDROCHLORIDE 20 MG/ML
5 INJECTION INTRAMUSCULAR; INTRAVENOUS EVERY 30 MIN PRN
Status: DISCONTINUED | OUTPATIENT
Start: 2025-01-17 | End: 2025-01-17 | Stop reason: HOSPADM

## 2025-01-17 RX ADMIN — PROPOFOL 20 MG: 10 INJECTION, EMULSION INTRAVENOUS at 12:06

## 2025-01-17 RX ADMIN — Medication 100 MCG: at 12:25

## 2025-01-17 RX ADMIN — CIPROFLOXACIN 400 MG: 400 INJECTION, SOLUTION INTRAVENOUS at 12:11

## 2025-01-17 RX ADMIN — SODIUM CHLORIDE, POTASSIUM CHLORIDE, SODIUM LACTATE AND CALCIUM CHLORIDE: 600; 310; 30; 20 INJECTION, SOLUTION INTRAVENOUS at 11:58

## 2025-01-17 RX ADMIN — PROPOFOL 40 MG: 10 INJECTION, EMULSION INTRAVENOUS at 12:12

## 2025-01-17 RX ADMIN — ONDANSETRON 4 MG: 2 INJECTION INTRAMUSCULAR; INTRAVENOUS at 12:18

## 2025-01-17 RX ADMIN — PROPOFOL 140 MG: 10 INJECTION, EMULSION INTRAVENOUS at 12:05

## 2025-01-17 RX ADMIN — PROPOFOL 50 MCG/KG/MIN: 10 INJECTION, EMULSION INTRAVENOUS at 12:16

## 2025-01-17 RX ADMIN — PROPOFOL 50 MCG/KG/MIN: 10 INJECTION, EMULSION INTRAVENOUS at 12:17

## 2025-01-17 RX ADMIN — Medication 20 MG: at 12:18

## 2025-01-17 RX ADMIN — LIDOCAINE HYDROCHLORIDE 100 MG: 20 INJECTION, SOLUTION EPIDURAL; INFILTRATION; INTRACAUDAL; PERINEURAL at 12:05

## 2025-01-17 SDOH — HEALTH STABILITY: MENTAL HEALTH: CURRENT SMOKER: 0

## 2025-01-17 ASSESSMENT — PAIN SCALES - GENERAL
PAINLEVEL_OUTOF10: 0 - NO PAIN
PAIN_LEVEL: 0
PAINLEVEL_OUTOF10: 0 - NO PAIN

## 2025-01-17 ASSESSMENT — COLUMBIA-SUICIDE SEVERITY RATING SCALE - C-SSRS
6. HAVE YOU EVER DONE ANYTHING, STARTED TO DO ANYTHING, OR PREPARED TO DO ANYTHING TO END YOUR LIFE?: NO
2. HAVE YOU ACTUALLY HAD ANY THOUGHTS OF KILLING YOURSELF?: NO
1. IN THE PAST MONTH, HAVE YOU WISHED YOU WERE DEAD OR WISHED YOU COULD GO TO SLEEP AND NOT WAKE UP?: NO

## 2025-01-17 ASSESSMENT — PAIN - FUNCTIONAL ASSESSMENT
PAIN_FUNCTIONAL_ASSESSMENT: 0-10

## 2025-01-17 NOTE — OP NOTE
right ureteroscopy flex vs rigid with laser and cysto with jj stent (R) Operative Note     Date: 2025  OR Location: [facility]    Name: David Caldwell, : 1945, Age: 79 y.o., MRN: [id number], Sex: male    Diagnosis  Pre-op Diagnosis      * Calculus of ureter [N20.1] Post-op Diagnosis     * Calculus of ureter [N20.1]     Procedures  right ureteroscopy flex vs rigid with laser and cysto with jj stent  54471 - MA CYSTO/URETERO W/LITHOTRIPSY &INDWELL STENT INSRT      Surgeons      * Abe Adams - Primary    Resident/Fellow/Other Assistant:  Surgeons and Role:  * No surgeons found with a matching role *    Staff:   Scrub Person: Ramiro  Circulator: Garth  Circulator: Emmie    Anesthesia Staff: Anesthesiologist: Nigel Paula MD  CRNA: PEYTON Diana-VAMSI    Procedure Summary  Anesthesia: General  ASA: III  Estimated Blood Loss: mL  Intra-op Medications:   Administrations occurring from 1145 to 1255 on 25:   Medication Name Total Dose   ciprofloxacin (Cipro) 400 mg in dextrose 5%  mL 400 mg   ketamine injection 50 mg/ 5 mL (10 mg/mL) 20 mg   lactated Ringer's infusion Cannot be calculated   lidocaine PF (Xylocaine-MPF) local injection 2 % 100 mg   ondansetron (Zofran) 2 mg/mL injection 4 mg   phenylephrine 100 mcg/mL syringe 10 mL (prefilled) 100 mcg   propofol (Diprivan) injection 10 mg/mL 356 mg   NaCl 0.9 % bolus Cannot be calculated              Anesthesia Record               Intraprocedure I/O Totals          Intake    lactated Ringer's infusion 800.00 mL    ciprofloxacin (Cipro) 400 mg in dextrose 5%  mL 200.00 mL    Total Intake 1000 mL          Specimen:   ID Type Source Tests Collected by Time   A : RIGHT URETER STONE FOR ANALYSIS Calculus Urine, Clean Catch CALCULI (STONE) ANALYSIS Abe Adams MD 2025 1227                 Drains and/or Catheters: * None in log *    Tourniquet Times:         Implants:     Findings:     Indications: David Caldwell is an 79  y.o. male who is having surgery for Calculus of ureter [N20.1].     The patient was seen in the preoperative area. The risks, benefits, complications, treatment options, non-operative alternatives, expected recovery and outcomes were discussed with the patient. The possibilities of reaction to medication, pulmonary aspiration, injury to surrounding structures, bleeding, recurrent infection, the need for additional procedures, failure to diagnose a condition, and creating a complication requiring transfusion or operation were discussed with the patient. The patient concurred with the proposed plan, giving informed consent.  The site of surgery was properly noted/marked if necessary per policy. The patient has been actively warmed in preoperative area. Preoperative antibiotics are not indicated. Venous thrombosis prophylaxis have been ordered including bilateral sequential compression devices    Procedure Details:       Preoperative diagnosis-right ureter stone    Postoperative diagnosis-same    Procedure performed-right rigid ureteroscopy holmium laser of stone extraction of stone and stent placement anesthesia-General Indications-patient presented with obstructing stone and wished to have surgery performed he did not wish further trial passage due to severe pain.  All risk benefits and alternatives were discussed    Findings-stone was impacted moderately in the distal right ureter broken and removed to 28 x 6 stent was placed the bladder showed no bladder tumors lesions or stones prostate was moderately obstructing.    Summary of procedure-patient was given IV Cipro placed in dorsal lithotomy position 21 Kazakh scope placed the meatus angled wire taken up into the right kidney using a 5 Kazakh Pollick catheter as a guide.  Rigid ureteroscope piggybacked over second wire stone was seen 200 and correction 365 µm fiber was used at a setting of 50 and 0.2 was broken and pieces were then atraumatically extracted with a  1.9 Jamaican basket.  A 28 x 6 stent was then placed over the safety wire with good positioning noted.  His bladder was emptied as well as stones these were sent for analysis    Disposition-will remove stent in office in 1 to 2 weeks and go through stone analysis        Abe Adams  Phone Number: [phone]

## 2025-01-17 NOTE — ANESTHESIA POSTPROCEDURE EVALUATION
Patient: David Caldwell    Procedure Summary       Date: 01/17/25 Room / Location: [facility] OR 05 / [facility]    Anesthesia Start: 1158 Anesthesia Stop: 1256    Procedure: right ureteroscopy flex vs rigid with laser and cysto with jj stent (Right) Diagnosis:       Calculus of ureter      (Calculus of ureter [N20.1])    Surgeons: Abe Adams MD Responsible Provider: Nigel Paula MD    Anesthesia Type: general ASA Status: 3            Anesthesia Type: general    Vitals Value Taken Time   /69 01/17/25 1251   Temp 36.2 01/17/25 1256   Pulse 56 01/17/25 1254   Resp 14 01/17/25 1254   SpO2 100 % 01/17/25 1254   Vitals shown include unfiled device data.    Anesthesia Post Evaluation    Patient location during evaluation: PACU  Patient participation: complete - patient participated  Level of consciousness: sleepy but conscious and responsive to verbal stimuli  Pain score: 0  Pain management: adequate  Airway patency: patent  Cardiovascular status: acceptable and blood pressure returned to baseline  Respiratory status: acceptable, unassisted, spontaneous ventilation, nonlabored ventilation and face mask  Hydration status: acceptable  Postoperative Nausea and Vomiting: none  Comments: Handoff to Thelma, PACU RN        There were no known notable events for this encounter.

## 2025-01-17 NOTE — ANESTHESIA PREPROCEDURE EVALUATION
Patient: David Caldwell    Procedure Information       Date/Time: 01/17/25 1145    Procedure: right ureteroscopy flex vs rigid with laser and cysto with jj stent (Right)    Location: [facility] OR 05 / [facility]    Surgeons: Abe Adams MD            Relevant Problems   Cardiac   (+) Benign essential hypertension   (+) Hyperlipidemia   (+) Hypertension   (+) Permanent atrial fibrillation (Multi)      Pulmonary   (+) GUAJARDO (dyspnea on exertion)   (+) CATHI (obstructive sleep apnea)      Neuro   (+) Myasthenia gravis   (+) Thalamic stroke (Multi)      GI   (+) GERD (gastroesophageal reflux disease)      /Renal   (+) BPH (benign prostatic hyperplasia)   (+) Calcium nephrolithiasis      Hematology   (+) Anemia   (+) Deficiency anemia   (+) Other dietary vitamin B12 deficiency anemia      Musculoskeletal   (+) Cervical spondylosis without myelopathy   (+) Lumbosacral spondylosis without myelopathy      ID   (+) Bilateral plantar wart   (+) Tinea pedis, recurrent       Clinical information reviewed:   Tobacco  Allergies  Meds  Problems  Med Hx  Surg Hx   Fam Hx  Soc   Hx        NPO Detail:  NPO/Void Status  Date of Last Liquid: 01/17/25  Time of Last Liquid: 0100 (water only)  Date of Last Solid: 01/16/25  Time of Last Solid: 1800  Last Intake Type: Clear fluids  Time of Last Void: 0830         Physical Exam    Airway  Mallampati: II  TM distance: >3 FB  Neck ROM: full     Cardiovascular   Rhythm: regular  Rate: normal     Dental - normal exam     Pulmonary   Breath sounds clear to auscultation  (+) decreased breath sounds     Abdominal   (+) obese  Abdomen: soft  Bowel sounds: normal           Anesthesia Plan    History of general anesthesia?: yes  History of complications of general anesthesia?: no    ASA 3     general     The patient is not a current smoker.  Patient was previously instructed to abstain from smoking on day of procedure.  Patient did not smoke on day of procedure.  Education provided regarding  risk of obstructive sleep apnea.  intravenous induction   Postoperative administration of opioids is intended.  Anesthetic plan and risks discussed with patient.  Use of blood products discussed with patient who consented to blood products.    Plan discussed with CRNA.

## 2025-01-17 NOTE — ANESTHESIA PROCEDURE NOTES
Airway  Date/Time: 1/17/2025 12:07 PM  Urgency: elective      Staffing  Performed: CRNA   Authorized by: Nigel Paula MD    Performed by: PEYTON Diana-VAMSI  Patient location during procedure: OR    Indications and Patient Condition  Indications for airway management: anesthesia  Spontaneous Ventilation: absent  Sedation level: deep  Preoxygenated: yes  Patient position: sniffing  Mask difficulty assessment: 0 - not attempted    Final Airway Details  Final airway type: supraglottic airway      Successful airway: classic  Size 4     Number of attempts at approach: 1

## 2025-01-22 LAB
APPEARANCE STONE: NORMAL
COMPN STONE: NORMAL
SPECIMEN WT: 43 MG

## 2025-04-04 ENCOUNTER — APPOINTMENT (OUTPATIENT)
Dept: PRIMARY CARE | Facility: CLINIC | Age: 80
End: 2025-04-04
Payer: MEDICARE

## 2025-04-04 VITALS
TEMPERATURE: 97.1 F | OXYGEN SATURATION: 98 % | SYSTOLIC BLOOD PRESSURE: 130 MMHG | WEIGHT: 180 LBS | BODY MASS INDEX: 25.77 KG/M2 | HEART RATE: 76 BPM | RESPIRATION RATE: 14 BRPM | HEIGHT: 70 IN | DIASTOLIC BLOOD PRESSURE: 60 MMHG

## 2025-04-04 DIAGNOSIS — E53.8 LOW SERUM VITAMIN B12: ICD-10-CM

## 2025-04-04 DIAGNOSIS — M25.512 CHRONIC LEFT SHOULDER PAIN: ICD-10-CM

## 2025-04-04 DIAGNOSIS — G70.00 MYASTHENIA GRAVIS: ICD-10-CM

## 2025-04-04 DIAGNOSIS — G89.29 CHRONIC LEFT SHOULDER PAIN: ICD-10-CM

## 2025-04-04 DIAGNOSIS — E78.00 PURE HYPERCHOLESTEROLEMIA: ICD-10-CM

## 2025-04-04 DIAGNOSIS — I10 BENIGN ESSENTIAL HYPERTENSION: Primary | ICD-10-CM

## 2025-04-04 DIAGNOSIS — I48.21 PERMANENT ATRIAL FIBRILLATION (MULTI): ICD-10-CM

## 2025-04-04 RX ORDER — TRIAMCINOLONE ACETONIDE 40 MG/ML
40 INJECTION, SUSPENSION INTRA-ARTICULAR; INTRAMUSCULAR
Status: COMPLETED | OUTPATIENT
Start: 2025-04-04 | End: 2025-04-04

## 2025-04-04 RX ORDER — LIDOCAINE HYDROCHLORIDE 10 MG/ML
1 INJECTION, SOLUTION INFILTRATION; PERINEURAL
Status: COMPLETED | OUTPATIENT
Start: 2025-04-04 | End: 2025-04-04

## 2025-04-04 RX ORDER — CYANOCOBALAMIN 1000 UG/ML
1000 INJECTION, SOLUTION INTRAMUSCULAR; SUBCUTANEOUS ONCE
Status: COMPLETED | OUTPATIENT
Start: 2025-04-04 | End: 2025-04-04

## 2025-04-04 RX ADMIN — TRIAMCINOLONE ACETONIDE 40 MG: 40 INJECTION, SUSPENSION INTRA-ARTICULAR; INTRAMUSCULAR at 21:31

## 2025-04-04 RX ADMIN — LIDOCAINE HYDROCHLORIDE 1 ML: 10 INJECTION, SOLUTION INFILTRATION; PERINEURAL at 21:31

## 2025-04-04 RX ADMIN — CYANOCOBALAMIN 1000 MCG: 1000 INJECTION, SOLUTION INTRAMUSCULAR; SUBCUTANEOUS at 13:34

## 2025-04-04 ASSESSMENT — ENCOUNTER SYMPTOMS
CONSTIPATION: 0
COUGH: 0
ABDOMINAL PAIN: 0
NAUSEA: 0
SHORTNESS OF BREATH: 0
WHEEZING: 0
HYPERTENSION: 1
DIARRHEA: 0
PALPITATIONS: 0

## 2025-04-04 NOTE — PROGRESS NOTES
"Subjective   Patient ID: David Caldwell is a 79 y.o. male who presents for Hypertension.    Hypertension  Pertinent negatives include no chest pain, palpitations or shortness of breath.   Overall he is feeling well with the exception of his left shoulder.  This has been increasingly more more bothersome as months of going on.  Denies any injury.  He has been told he is got significant arthritis in the past.  Denies any swelling, redness or warmth, and no fevers chills or sweats    Review of Systems   Respiratory:  Negative for cough, shortness of breath and wheezing.    Cardiovascular:  Negative for chest pain and palpitations.   Gastrointestinal:  Negative for abdominal pain, constipation, diarrhea and nausea.       Objective   /60 (BP Location: Left arm, Patient Position: Sitting, BP Cuff Size: Adult)   Pulse 76   Temp 36.2 °C (97.1 °F) (Tympanic)   Resp 14   Ht 1.778 m (5' 10\")   Wt 81.6 kg (180 lb)   SpO2 98%   BMI 25.83 kg/m²     Physical Exam  Vitals reviewed.   Constitutional:       Appearance: Normal appearance.   HENT:      Head: Normocephalic.   Cardiovascular:      Rate and Rhythm: Normal rate.   Pulmonary:      Effort: Pulmonary effort is normal.   Musculoskeletal:      Comments: Decreased range of motion due to pain.  Positive crepitus on passive range of motion   Neurological:      General: No focal deficit present.      Mental Status: He is alert.   Psychiatric:         Mood and Affect: Mood normal.         Assessment/Plan   Problem List Items Addressed This Visit             ICD-10-CM    Permanent atrial fibrillation (Multi) I48.21    Benign essential hypertension - Primary I10    Myasthenia gravis G70.00    Hyperlipidemia E78.5     Other Visit Diagnoses         Codes    Low serum vitamin B12     E53.8    Relevant Medications    cyanocobalamin (Vitamin B-12) injection 1,000 mcg (Completed)    Chronic left shoulder pain     M25.512, G89.29        We discussed all the " above.  Hypertension is controlled.  A-fib is controlled as well.  Tolerating medications.  No changes are needed.  We did discuss his B12.  Shot was given and we discussed taking daily B12 at 1000 mcg and dissolvable form.  Chronic left shoulder pain consistent with arthritis.  He would like to proceed with left shoulder injection.  Patient ID: David Caldwell is a 79 y.o. male.    Joint Injection Large/Arthrocentesis: L glenohumeral on 4/4/2025 9:31 PM  Indications: pain  Details: 25 G needle, posterior approach  Medications: 40 mg triamcinolone acetonide 40 mg/mL; 1 mL lidocaine 10 mg/mL (1 %)    Chronic left shoulder pain consistent with osteoarthritis.  He would like to proceed with steroid injection.  Posterior aspect of left shoulder was prepped with alcohol.  In 1 syringe 40 mg of Kenalog was drawn up and mixed with 1 cc of 1% lidocaine.  This mixture was successfully injected into the patient's left shoulder.  Sterile technique was maintained throughout.  Patient tolerated procedure well.  Band-Aid was applied.  Recommend that he avoid strenuous exercise and activities for the next 24 to 48 hours.  He should follow-up as needed.  Consent was given by the patient.

## 2025-06-11 DIAGNOSIS — I10 PRIMARY HYPERTENSION: ICD-10-CM

## 2025-06-11 RX ORDER — AMLODIPINE BESYLATE 5 MG/1
5 TABLET ORAL DAILY
Qty: 90 TABLET | Refills: 3 | Status: SHIPPED | OUTPATIENT
Start: 2025-06-11 | End: 2026-06-11

## 2025-06-19 ENCOUNTER — APPOINTMENT (OUTPATIENT)
Dept: CARDIOLOGY | Facility: CLINIC | Age: 80
End: 2025-06-19
Payer: MEDICARE

## 2025-06-19 VITALS
HEIGHT: 70 IN | HEART RATE: 82 BPM | BODY MASS INDEX: 25.34 KG/M2 | WEIGHT: 177 LBS | DIASTOLIC BLOOD PRESSURE: 76 MMHG | SYSTOLIC BLOOD PRESSURE: 136 MMHG | OXYGEN SATURATION: 97 %

## 2025-06-19 DIAGNOSIS — I10 BENIGN ESSENTIAL HYPERTENSION: ICD-10-CM

## 2025-06-19 DIAGNOSIS — E78.5 DYSLIPIDEMIA: ICD-10-CM

## 2025-06-19 DIAGNOSIS — R26.2 DIFFICULTY WALKING: ICD-10-CM

## 2025-06-19 DIAGNOSIS — Z98.61 CAD S/P PERCUTANEOUS CORONARY ANGIOPLASTY: Primary | ICD-10-CM

## 2025-06-19 DIAGNOSIS — I20.0 UNSTABLE ANGINA (MULTI): ICD-10-CM

## 2025-06-19 DIAGNOSIS — I25.10 CAD S/P PERCUTANEOUS CORONARY ANGIOPLASTY: Primary | ICD-10-CM

## 2025-06-19 DIAGNOSIS — I48.21 PERMANENT ATRIAL FIBRILLATION (MULTI): ICD-10-CM

## 2025-06-19 PROCEDURE — 99215 OFFICE O/P EST HI 40 MIN: CPT | Performed by: INTERNAL MEDICINE

## 2025-06-19 PROCEDURE — 99212 OFFICE O/P EST SF 10 MIN: CPT

## 2025-06-19 PROCEDURE — 1160F RVW MEDS BY RX/DR IN RCRD: CPT | Performed by: INTERNAL MEDICINE

## 2025-06-19 PROCEDURE — 1036F TOBACCO NON-USER: CPT | Performed by: INTERNAL MEDICINE

## 2025-06-19 PROCEDURE — 3075F SYST BP GE 130 - 139MM HG: CPT | Performed by: INTERNAL MEDICINE

## 2025-06-19 PROCEDURE — 1126F AMNT PAIN NOTED NONE PRSNT: CPT | Performed by: INTERNAL MEDICINE

## 2025-06-19 PROCEDURE — 1159F MED LIST DOCD IN RCRD: CPT | Performed by: INTERNAL MEDICINE

## 2025-06-19 PROCEDURE — 3078F DIAST BP <80 MM HG: CPT | Performed by: INTERNAL MEDICINE

## 2025-06-19 ASSESSMENT — PAIN SCALES - GENERAL: PAINLEVEL_OUTOF10: 0-NO PAIN

## 2025-06-19 NOTE — PROGRESS NOTES
Got it thank you Chief Complaint:   No chief complaint on file.     History Of Present Illness:    David Caldwell is a 79 y.o. male with a history of myasthenia gravis, permanent atrial fibrillation, CAD (PCI of RCA 2021 in the setting of NSTEMI, LAD noted to be occluded), dyslipidemia, CVA, and hypertension here for follow-up.    Since he last was here he tells me that he has been experiencing shortness of breath with activity such as walking from the parking lot into the [employer] or even working out.  If he malone even in the [email or web address] and gets to the front door he is short of breath.  This is something new over the past few months.  He denies any chest pain or palpitations.  When he is at [employer] he feels weak when he first gets in.  After he starts working out he feels a little bit better but realizes that his stamina is not what it used to be.    He does have myasthenia gravis and says that it might be difficult to differentiate whether his symptoms are cardiac or from his MG.    Prior to his STEMI in 2021 he was at [employer] ([employer]) at 4:30 mass.  He had ordered a pizza at [employer] by Brian.  He was talking to one of his friends after mass at the time slipped away.  He decided to hurry from the [employer] to his car which was parked at the [employer] office.  He felt somewhat winded when he got there.  He went and got the pizza and drove at home.  By the time he got it inside and sat it down he just did not feel well.  He sat down in his chair and told his wife that he needed her to call EMS.  Apparently afterwards he developed some shortness of breath but does not specifically recall chest pain.  The notes from that visit states that he had chest pain.    Tried pravastatin and had muscle aches.  Also had myalgias with atorvastatin, simvastatin, and ezetimibe.    Had a CVA in November 2023.  Went to the [facility].  Saw a neurologist afterwards who recommended that he be on statin.   "The patient says that he had been on atorvastatin and rosuvastatin in the past however had significant bone pain and muscle pain.  He has not been taking the rosuvastatin even though it is listed.    He tells me that he graduated from [employer] and went to college at [employer].    Lexiscan nuclear stress test 10/25/2024: Moderate area of moderate to severe perfusion defect in the mid anterior wall extending into the apex concerning for ischemia of the LAD territory.  EF 61%.  Patient with known occluded LAD by catheterization in 2021.  Findings of nuclear stress test are consistent with known occluded LAD.    Echocardiogram 9/5/2021: EF 55 to 60%.  Grade 1 diastolic dysfunction.  Mild to moderate left atrial enlargement.    PCI to RCA 9/4/2021: 3.5 x 18 mm Jian KENYA.  LAD occluded proximally.  Large dominant RCA with 90% mid stenosis.       Allergies:  Morphine, Onion, Opioids-meperidine and related, Penicillins, Rosuvastatin, and Opioids - morphine analogues    Outpatient Medications:  Current Outpatient Medications   Medication Instructions    amLODIPine (NORVASC) 5 mg, oral, Daily    azaTHIOprine (Imuran) 50 mg tablet 2 tablets, Daily    Xarelto 20 mg, oral, Daily with evening meal       Last Recorded Vitals:  Visit Vitals  /76 (BP Location: Left arm, Patient Position: Sitting, BP Cuff Size: Adult)   Pulse 82   Ht 1.778 m (5' 10\")   Wt 80.3 kg (177 lb)   SpO2 97%   BMI 25.40 kg/m²   Smoking Status Never   BSA 1.99 m²      LASTWT(3):   Wt Readings from Last 3 Encounters:   06/19/25 80.3 kg (177 lb)   04/04/25 81.6 kg (180 lb)   01/17/25 79.4 kg (175 lb)       Physical Exam:  In general: alert and in no acute distress.   HEENT: Carotid upstrokes normal with no bruits. JVP is normal.   Pulmonary: Clear to auscultation bilaterally.  Cardiovascular: S1,S2, regular. No appreciable murmurs, rubs or gallops.   Lower extremities: Warm. 2+ distal pulses. No edema.     Last Labs:  CBC -  Recent Labs     " 01/09/25  0910 12/20/24  1145 12/18/23  1031   WBC 10.7 4.6 4.4   HGB 13.3* 12.9* 11.6*   HCT 41.1 40.6* 39.0*    192 245   MCV 91 92 85       CMP -  Recent Labs     01/14/25  1143 01/09/25  0910 12/20/24  1145 09/06/21  0604 09/05/21  0612 09/04/21  1851    136 141   < > 139 140   K 4.5 4.0 4.4   < > 3.9 4.3    101 104   < > 108* 103   CO2 27 26 26   < > 24 24   ANIONGAP 14 13 15   < > 11 17   BUN 17 17 17   < > 19 22   CREATININE 1.79* 1.37* 1.00   < > 0.90 1.13   EGFR 38* 52* 76   < >  --   --    MG  --   --   --   --  1.90 2.10    < > = values in this interval not displayed.     Recent Labs     01/09/25  0910 12/20/24  1145 12/18/23  1031   ALBUMIN 4.0 3.9 3.8   ALKPHOS 73 71 69   ALT 10 13 6*   AST 18 20 15   BILITOT 1.5* 1.2 1.1       LIPID PANEL -   Recent Labs     12/20/24  1145 12/18/23  1031 04/11/23  1045 04/13/22  0946 12/11/21  1100   CHOL 183 177 173 184 208*   LDLCALC 121* 125*  --   --   --    LDLF  --   --  118* 126* 150*   HDL 47.2 37.3 42.4 43.0 36.0*   TRIG 73 76 64 73 109       Recent Labs     12/18/23  1031 11/28/23  0329 09/05/21  0612 09/04/21  1851   BNP  --   --   --  170*   HGBA1C 5.3 5.5 5.1  --            Assessment/Plan   1) CAD: PCI to RCA 2021 in setting of NSTEMI.  LAD noted to be occluded at that time.  Now with exertional dyspnea which is worsening and likely represents unstable anginal symptoms.  He did have a Lexiscan nuclear stress test which demonstrated anterior ischemia which could be consistent with his chronic total occlusion.  Alternatively it could represent disease of the RCA or perhaps circumflex leading to diminished collateral flow.  It would be difficult to ascertain this on additional nuclear testing.  CT angiography might also not be helpful because of his prior stent.  We talked about catheterization.  I went over the risks and benefits including the risk of MI, CVA, or death being less than 1%.  We have decided to proceed with catheterization.   Further recommendations based on those results.    2) permanent atrial fibrillation: On anticoagulation with Xarelto.    3) dyslipidemia: LDL not at goal.  Significant statin induced myalgias with rosuvastatin, atorvastatin, pravastatin, and ezetimibe.  Given his myasthenia gravis I am concerned about using any of the injectable PCSK9 inhibitors.  We could consider Nexletol in the future.    Could always consider emanation Nexletol and ezetimibe if he is tolerant of each.    4) hypertension: Blood pressure controlled.  No changes needed.    5) follow-up: 6 months or sooner based on the results of the catheterization.      Eduardo Alegre MD

## 2025-06-25 ENCOUNTER — APPOINTMENT (OUTPATIENT)
Dept: CARDIOLOGY | Facility: HOSPITAL | Age: 80
End: 2025-06-25
Payer: MEDICARE

## 2025-06-25 ENCOUNTER — HOSPITAL ENCOUNTER (INPATIENT)
Facility: HOSPITAL | Age: 80
LOS: 2 days | Discharge: HOME | End: 2025-06-27
Attending: INTERNAL MEDICINE | Admitting: INTERNAL MEDICINE
Payer: MEDICARE

## 2025-06-25 ENCOUNTER — APPOINTMENT (OUTPATIENT)
Dept: RADIOLOGY | Facility: HOSPITAL | Age: 80
End: 2025-06-25
Payer: MEDICARE

## 2025-06-25 DIAGNOSIS — I48.21 PERMANENT ATRIAL FIBRILLATION (MULTI): ICD-10-CM

## 2025-06-25 DIAGNOSIS — R29.90 STROKE-LIKE SYMPTOMS: Primary | ICD-10-CM

## 2025-06-25 DIAGNOSIS — Z98.61 CAD S/P PERCUTANEOUS CORONARY ANGIOPLASTY: ICD-10-CM

## 2025-06-25 DIAGNOSIS — I25.10 CAD S/P PERCUTANEOUS CORONARY ANGIOPLASTY: ICD-10-CM

## 2025-06-25 DIAGNOSIS — I67.89 OTHER CEREBROVASCULAR DISEASE: ICD-10-CM

## 2025-06-25 DIAGNOSIS — I63.9 CEREBRAL INFARCTION, UNSPECIFIED: ICD-10-CM

## 2025-06-25 DIAGNOSIS — G45.9 MINI STROKE: ICD-10-CM

## 2025-06-25 DIAGNOSIS — I20.0 UNSTABLE ANGINA (MULTI): ICD-10-CM

## 2025-06-25 DIAGNOSIS — I63.81 THALAMIC STROKE (MULTI): ICD-10-CM

## 2025-06-25 LAB
ACT BLD: >397 SEC (ref 83–199)
ALBUMIN SERPL BCP-MCNC: 3.7 G/DL (ref 3.4–5)
ALP SERPL-CCNC: 61 U/L (ref 33–136)
ALT SERPL W P-5'-P-CCNC: 8 U/L (ref 10–52)
ANION GAP SERPL CALC-SCNC: 12 MMOL/L (ref 10–20)
ANION GAP SERPL CALC-SCNC: 12 MMOL/L (ref 10–20)
APTT PPP: 34 SECONDS (ref 26–36)
AST SERPL W P-5'-P-CCNC: 22 U/L (ref 9–39)
BILIRUB SERPL-MCNC: 1 MG/DL (ref 0–1.2)
BUN SERPL-MCNC: 14 MG/DL (ref 6–23)
BUN SERPL-MCNC: 15 MG/DL (ref 6–23)
CALCIUM SERPL-MCNC: 9 MG/DL (ref 8.6–10.3)
CALCIUM SERPL-MCNC: 9.3 MG/DL (ref 8.6–10.3)
CHLORIDE SERPL-SCNC: 105 MMOL/L (ref 98–107)
CHLORIDE SERPL-SCNC: 105 MMOL/L (ref 98–107)
CO2 SERPL-SCNC: 21 MMOL/L (ref 21–32)
CO2 SERPL-SCNC: 25 MMOL/L (ref 21–32)
CREAT SERPL-MCNC: 0.88 MG/DL (ref 0.5–1.3)
CREAT SERPL-MCNC: 0.95 MG/DL (ref 0.5–1.3)
EGFRCR SERPLBLD CKD-EPI 2021: 81 ML/MIN/1.73M*2
EGFRCR SERPLBLD CKD-EPI 2021: 87 ML/MIN/1.73M*2
ERYTHROCYTE [DISTWIDTH] IN BLOOD BY AUTOMATED COUNT: 14.6 % (ref 11.5–14.5)
ERYTHROCYTE [DISTWIDTH] IN BLOOD BY AUTOMATED COUNT: 14.7 % (ref 11.5–14.5)
GLUCOSE BLD MANUAL STRIP-MCNC: 165 MG/DL (ref 74–99)
GLUCOSE BLD MANUAL STRIP-MCNC: 95 MG/DL (ref 74–99)
GLUCOSE SERPL-MCNC: 140 MG/DL (ref 74–99)
GLUCOSE SERPL-MCNC: 95 MG/DL (ref 74–99)
HCT VFR BLD AUTO: 41.3 % (ref 41–52)
HCT VFR BLD AUTO: 44.2 % (ref 41–52)
HGB BLD-MCNC: 13.7 G/DL (ref 13.5–17.5)
HGB BLD-MCNC: 13.8 G/DL (ref 13.5–17.5)
INR PPP: 1.2 (ref 0.9–1.1)
MAGNESIUM SERPL-MCNC: 2.2 MG/DL (ref 1.6–2.4)
MCH RBC QN AUTO: 30.4 PG (ref 26–34)
MCH RBC QN AUTO: 30.7 PG (ref 26–34)
MCHC RBC AUTO-ENTMCNC: 31.2 G/DL (ref 32–36)
MCHC RBC AUTO-ENTMCNC: 33.2 G/DL (ref 32–36)
MCV RBC AUTO: 93 FL (ref 80–100)
MCV RBC AUTO: 97 FL (ref 80–100)
NRBC BLD-RTO: 0 /100 WBCS (ref 0–0)
NRBC BLD-RTO: 0 /100 WBCS (ref 0–0)
PLATELET # BLD AUTO: 180 X10*3/UL (ref 150–450)
PLATELET # BLD AUTO: 202 X10*3/UL (ref 150–450)
POTASSIUM SERPL-SCNC: 4.2 MMOL/L (ref 3.5–5.3)
POTASSIUM SERPL-SCNC: 4.3 MMOL/L (ref 3.5–5.3)
PROT SERPL-MCNC: 7.3 G/DL (ref 6.4–8.2)
PROTHROMBIN TIME: 13.1 SECONDS (ref 9.8–12.4)
RBC # BLD AUTO: 4.46 X10*6/UL (ref 4.5–5.9)
RBC # BLD AUTO: 4.54 X10*6/UL (ref 4.5–5.9)
SODIUM SERPL-SCNC: 134 MMOL/L (ref 136–145)
SODIUM SERPL-SCNC: 138 MMOL/L (ref 136–145)
WBC # BLD AUTO: 5.7 X10*3/UL (ref 4.4–11.3)
WBC # BLD AUTO: 6.4 X10*3/UL (ref 4.4–11.3)

## 2025-06-25 PROCEDURE — 80048 BASIC METABOLIC PNL TOTAL CA: CPT | Mod: CCI | Performed by: NURSE PRACTITIONER

## 2025-06-25 PROCEDURE — 99222 1ST HOSP IP/OBS MODERATE 55: CPT | Performed by: INTERNAL MEDICINE

## 2025-06-25 PROCEDURE — 93010 ELECTROCARDIOGRAM REPORT: CPT | Performed by: INTERNAL MEDICINE

## 2025-06-25 PROCEDURE — 70496 CT ANGIOGRAPHY HEAD: CPT

## 2025-06-25 PROCEDURE — 70450 CT HEAD/BRAIN W/O DYE: CPT | Performed by: RADIOLOGY

## 2025-06-25 PROCEDURE — 2550000001 HC RX 255 CONTRASTS: Mod: JW | Performed by: INTERNAL MEDICINE

## 2025-06-25 PROCEDURE — 85610 PROTHROMBIN TIME: CPT | Performed by: NURSE PRACTITIONER

## 2025-06-25 PROCEDURE — 85347 COAGULATION TIME ACTIVATED: CPT

## 2025-06-25 PROCEDURE — 36415 COLL VENOUS BLD VENIPUNCTURE: CPT | Performed by: NURSE PRACTITIONER

## 2025-06-25 PROCEDURE — 80048 BASIC METABOLIC PNL TOTAL CA: CPT | Performed by: INTERNAL MEDICINE

## 2025-06-25 PROCEDURE — 92978 ENDOLUMINL IVUS OCT C 1ST: CPT | Mod: RC | Performed by: INTERNAL MEDICINE

## 2025-06-25 PROCEDURE — 3E03317 INTRODUCTION OF OTHER THROMBOLYTIC INTO PERIPHERAL VEIN, PERCUTANEOUS APPROACH: ICD-10-PCS | Performed by: STUDENT IN AN ORGANIZED HEALTH CARE EDUCATION/TRAINING PROGRAM

## 2025-06-25 PROCEDURE — 92978 ENDOLUMINL IVUS OCT C 1ST: CPT | Performed by: INTERNAL MEDICINE

## 2025-06-25 PROCEDURE — 99223 1ST HOSP IP/OBS HIGH 75: CPT | Performed by: NURSE PRACTITIONER

## 2025-06-25 PROCEDURE — 85730 THROMBOPLASTIN TIME PARTIAL: CPT | Performed by: PSYCHIATRY & NEUROLOGY

## 2025-06-25 PROCEDURE — C9600 PERC DRUG-EL COR STENT SING: HCPCS | Mod: RC | Performed by: INTERNAL MEDICINE

## 2025-06-25 PROCEDURE — 96373 THER/PROPH/DIAG INJ IA: CPT | Performed by: INTERNAL MEDICINE

## 2025-06-25 PROCEDURE — C1760 CLOSURE DEV, VASC: HCPCS | Performed by: INTERNAL MEDICINE

## 2025-06-25 PROCEDURE — 93005 ELECTROCARDIOGRAM TRACING: CPT

## 2025-06-25 PROCEDURE — 93458 L HRT ARTERY/VENTRICLE ANGIO: CPT | Performed by: INTERNAL MEDICINE

## 2025-06-25 PROCEDURE — C1894 INTRO/SHEATH, NON-LASER: HCPCS | Performed by: INTERNAL MEDICINE

## 2025-06-25 PROCEDURE — 85027 COMPLETE CBC AUTOMATED: CPT | Performed by: INTERNAL MEDICINE

## 2025-06-25 PROCEDURE — 2500000004 HC RX 250 GENERAL PHARMACY W/ HCPCS (ALT 636 FOR OP/ED)

## 2025-06-25 PROCEDURE — 70450 CT HEAD/BRAIN W/O DYE: CPT

## 2025-06-25 PROCEDURE — 027034Z DILATION OF CORONARY ARTERY, ONE ARTERY WITH DRUG-ELUTING INTRALUMINAL DEVICE, PERCUTANEOUS APPROACH: ICD-10-PCS | Performed by: INTERNAL MEDICINE

## 2025-06-25 PROCEDURE — B2111ZZ FLUOROSCOPY OF MULTIPLE CORONARY ARTERIES USING LOW OSMOLAR CONTRAST: ICD-10-PCS | Performed by: INTERNAL MEDICINE

## 2025-06-25 PROCEDURE — 36415 COLL VENOUS BLD VENIPUNCTURE: CPT | Performed by: INTERNAL MEDICINE

## 2025-06-25 PROCEDURE — 2500000004 HC RX 250 GENERAL PHARMACY W/ HCPCS (ALT 636 FOR OP/ED): Performed by: NURSE PRACTITIONER

## 2025-06-25 PROCEDURE — 99223 1ST HOSP IP/OBS HIGH 75: CPT | Performed by: PSYCHIATRY & NEUROLOGY

## 2025-06-25 PROCEDURE — C1753 CATH, INTRAVAS ULTRASOUND: HCPCS | Performed by: INTERNAL MEDICINE

## 2025-06-25 PROCEDURE — 85347 COAGULATION TIME ACTIVATED: CPT | Performed by: INTERNAL MEDICINE

## 2025-06-25 PROCEDURE — C1725 CATH, TRANSLUMIN NON-LASER: HCPCS | Performed by: INTERNAL MEDICINE

## 2025-06-25 PROCEDURE — 4A023N7 MEASUREMENT OF CARDIAC SAMPLING AND PRESSURE, LEFT HEART, PERCUTANEOUS APPROACH: ICD-10-PCS | Performed by: INTERNAL MEDICINE

## 2025-06-25 PROCEDURE — 99152 MOD SED SAME PHYS/QHP 5/>YRS: CPT | Performed by: INTERNAL MEDICINE

## 2025-06-25 PROCEDURE — C1874 STENT, COATED/COV W/DEL SYS: HCPCS | Performed by: INTERNAL MEDICINE

## 2025-06-25 PROCEDURE — C1887 CATHETER, GUIDING: HCPCS | Performed by: INTERNAL MEDICINE

## 2025-06-25 PROCEDURE — 99153 MOD SED SAME PHYS/QHP EA: CPT | Performed by: INTERNAL MEDICINE

## 2025-06-25 PROCEDURE — 82947 ASSAY GLUCOSE BLOOD QUANT: CPT

## 2025-06-25 PROCEDURE — 2720000007 HC OR 272 NO HCPCS: Performed by: INTERNAL MEDICINE

## 2025-06-25 PROCEDURE — 83735 ASSAY OF MAGNESIUM: CPT | Performed by: NURSE PRACTITIONER

## 2025-06-25 PROCEDURE — 99291 CRITICAL CARE FIRST HOUR: CPT | Performed by: INTERNAL MEDICINE

## 2025-06-25 PROCEDURE — 92928 PRQ TCAT PLMT NTRAC ST 1 LES: CPT | Performed by: INTERNAL MEDICINE

## 2025-06-25 PROCEDURE — 2020000001 HC ICU ROOM DAILY

## 2025-06-25 PROCEDURE — 70498 CT ANGIOGRAPHY NECK: CPT | Performed by: STUDENT IN AN ORGANIZED HEALTH CARE EDUCATION/TRAINING PROGRAM

## 2025-06-25 PROCEDURE — 2500000004 HC RX 250 GENERAL PHARMACY W/ HCPCS (ALT 636 FOR OP/ED): Performed by: INTERNAL MEDICINE

## 2025-06-25 PROCEDURE — 2550000001 HC RX 255 CONTRASTS: Performed by: INTERNAL MEDICINE

## 2025-06-25 PROCEDURE — 2500000001 HC RX 250 WO HCPCS SELF ADMINISTERED DRUGS (ALT 637 FOR MEDICARE OP): Performed by: INTERNAL MEDICINE

## 2025-06-25 PROCEDURE — B221Z2Z COMPUTERIZED TOMOGRAPHY (CT SCAN) OF MULTIPLE CORONARY ARTERIES USING INTRAVASCULAR OPTICAL COHERENCE: ICD-10-PCS | Performed by: INTERNAL MEDICINE

## 2025-06-25 PROCEDURE — 70496 CT ANGIOGRAPHY HEAD: CPT | Performed by: STUDENT IN AN ORGANIZED HEALTH CARE EDUCATION/TRAINING PROGRAM

## 2025-06-25 PROCEDURE — 2780000003 HC OR 278 NO HCPCS: Performed by: INTERNAL MEDICINE

## 2025-06-25 PROCEDURE — C1769 GUIDE WIRE: HCPCS | Performed by: INTERNAL MEDICINE

## 2025-06-25 PROCEDURE — 85027 COMPLETE CBC AUTOMATED: CPT | Performed by: NURSE PRACTITIONER

## 2025-06-25 DEVICE — EVEROLIMUS-ELUTING PLATINUM CHROMIUM CORONARY STENT SYSTEM
Type: IMPLANTABLE DEVICE | Site: CORONARY | Status: FUNCTIONAL
Brand: SYNERGY™ XD

## 2025-06-25 RX ORDER — POLYETHYLENE GLYCOL 3350 17 G/17G
17 POWDER, FOR SOLUTION ORAL DAILY
Status: DISCONTINUED | OUTPATIENT
Start: 2025-06-25 | End: 2025-06-27 | Stop reason: HOSPADM

## 2025-06-25 RX ORDER — POLYETHYLENE GLYCOL 3350 17 G/17G
17 POWDER, FOR SOLUTION ORAL DAILY PRN
Status: DISCONTINUED | OUTPATIENT
Start: 2025-06-25 | End: 2025-06-27 | Stop reason: HOSPADM

## 2025-06-25 RX ORDER — PROCHLORPERAZINE 25 MG/1
25 SUPPOSITORY RECTAL EVERY 12 HOURS PRN
Status: DISCONTINUED | OUTPATIENT
Start: 2025-06-25 | End: 2025-06-27 | Stop reason: HOSPADM

## 2025-06-25 RX ORDER — AMLODIPINE BESYLATE 5 MG/1
5 TABLET ORAL DAILY
Status: DISCONTINUED | OUTPATIENT
Start: 2025-06-26 | End: 2025-06-27 | Stop reason: HOSPADM

## 2025-06-25 RX ORDER — NITROGLYCERIN 5 MG/ML
INJECTION, SOLUTION INTRAVENOUS AS NEEDED
Status: DISCONTINUED | OUTPATIENT
Start: 2025-06-25 | End: 2025-06-25 | Stop reason: HOSPADM

## 2025-06-25 RX ORDER — SODIUM CHLORIDE 9 MG/ML
100 INJECTION, SOLUTION INTRAVENOUS CONTINUOUS
Status: DISCONTINUED | OUTPATIENT
Start: 2025-06-25 | End: 2025-06-26

## 2025-06-25 RX ORDER — PROCHLORPERAZINE EDISYLATE 5 MG/ML
10 INJECTION INTRAMUSCULAR; INTRAVENOUS EVERY 6 HOURS PRN
Status: DISCONTINUED | OUTPATIENT
Start: 2025-06-25 | End: 2025-06-27 | Stop reason: HOSPADM

## 2025-06-25 RX ORDER — ATROPINE SULFATE 0.1 MG/ML
INJECTION INTRAVENOUS AS NEEDED
Status: DISCONTINUED | OUTPATIENT
Start: 2025-06-25 | End: 2025-06-25 | Stop reason: HOSPADM

## 2025-06-25 RX ORDER — DIPHENHYDRAMINE HYDROCHLORIDE 50 MG/ML
INJECTION, SOLUTION INTRAMUSCULAR; INTRAVENOUS
Status: COMPLETED
Start: 2025-06-25 | End: 2025-06-25

## 2025-06-25 RX ORDER — NAPROXEN SODIUM 220 MG/1
81 TABLET, FILM COATED ORAL DAILY
Status: DISCONTINUED | OUTPATIENT
Start: 2025-06-26 | End: 2025-06-26

## 2025-06-25 RX ORDER — ASPIRIN 325 MG
TABLET ORAL AS NEEDED
Status: DISCONTINUED | OUTPATIENT
Start: 2025-06-25 | End: 2025-06-25 | Stop reason: HOSPADM

## 2025-06-25 RX ORDER — LORAZEPAM 2 MG/ML
INJECTION INTRAMUSCULAR
Status: COMPLETED
Start: 2025-06-25 | End: 2025-06-25

## 2025-06-25 RX ORDER — FENTANYL CITRATE 50 UG/ML
INJECTION, SOLUTION INTRAMUSCULAR; INTRAVENOUS AS NEEDED
Status: DISCONTINUED | OUTPATIENT
Start: 2025-06-25 | End: 2025-06-25 | Stop reason: HOSPADM

## 2025-06-25 RX ORDER — LIDOCAINE HYDROCHLORIDE 20 MG/ML
INJECTION, SOLUTION INFILTRATION; PERINEURAL AS NEEDED
Status: DISCONTINUED | OUTPATIENT
Start: 2025-06-25 | End: 2025-06-25 | Stop reason: HOSPADM

## 2025-06-25 RX ORDER — CLOPIDOGREL BISULFATE 75 MG/1
75 TABLET ORAL DAILY
Status: DISCONTINUED | OUTPATIENT
Start: 2025-06-26 | End: 2025-06-25

## 2025-06-25 RX ORDER — NICARDIPINE HYDROCHLORIDE 0.2 MG/ML
0-15 INJECTION INTRAVENOUS CONTINUOUS
Status: DISCONTINUED | OUTPATIENT
Start: 2025-06-25 | End: 2025-06-26

## 2025-06-25 RX ORDER — AZATHIOPRINE 50 MG/1
100 TABLET ORAL DAILY
Status: DISCONTINUED | OUTPATIENT
Start: 2025-06-25 | End: 2025-06-27 | Stop reason: HOSPADM

## 2025-06-25 RX ORDER — DEXMEDETOMIDINE HYDROCHLORIDE 4 UG/ML
INJECTION, SOLUTION INTRAVENOUS
Status: COMPLETED
Start: 2025-06-25 | End: 2025-06-25

## 2025-06-25 RX ORDER — ONDANSETRON HYDROCHLORIDE 2 MG/ML
INJECTION, SOLUTION INTRAVENOUS
Status: COMPLETED
Start: 2025-06-25 | End: 2025-06-25

## 2025-06-25 RX ORDER — HALOPERIDOL LACTATE 5 MG/ML
INJECTION, SOLUTION INTRAMUSCULAR
Status: COMPLETED
Start: 2025-06-25 | End: 2025-06-25

## 2025-06-25 RX ORDER — DEXMEDETOMIDINE HYDROCHLORIDE 4 UG/ML
0-1.5 INJECTION, SOLUTION INTRAVENOUS CONTINUOUS
Status: DISCONTINUED | OUTPATIENT
Start: 2025-06-25 | End: 2025-06-26

## 2025-06-25 RX ORDER — HALOPERIDOL LACTATE 5 MG/ML
5 INJECTION, SOLUTION INTRAMUSCULAR ONCE
Status: DISCONTINUED | OUTPATIENT
Start: 2025-06-25 | End: 2025-06-25

## 2025-06-25 RX ORDER — LABETALOL HYDROCHLORIDE 5 MG/ML
10 INJECTION, SOLUTION INTRAVENOUS EVERY 10 MIN PRN
Status: DISCONTINUED | OUTPATIENT
Start: 2025-06-25 | End: 2025-06-27 | Stop reason: HOSPADM

## 2025-06-25 RX ORDER — HEPARIN SODIUM 1000 [USP'U]/ML
INJECTION, SOLUTION INTRAVENOUS; SUBCUTANEOUS AS NEEDED
Status: DISCONTINUED | OUTPATIENT
Start: 2025-06-25 | End: 2025-06-25 | Stop reason: HOSPADM

## 2025-06-25 RX ORDER — DEXTROSE 50 % IN WATER (D50W) INTRAVENOUS SYRINGE
25
Status: DISCONTINUED | OUTPATIENT
Start: 2025-06-25 | End: 2025-06-27 | Stop reason: HOSPADM

## 2025-06-25 RX ORDER — ASPIRIN 300 MG/1
300 SUPPOSITORY RECTAL DAILY
Status: DISCONTINUED | OUTPATIENT
Start: 2025-06-26 | End: 2025-06-26

## 2025-06-25 RX ORDER — ASPIRIN 81 MG/1
81 TABLET ORAL DAILY
Status: DISCONTINUED | OUTPATIENT
Start: 2025-06-26 | End: 2025-06-26

## 2025-06-25 RX ORDER — DEXTROSE 50 % IN WATER (D50W) INTRAVENOUS SYRINGE
12.5
Status: DISCONTINUED | OUTPATIENT
Start: 2025-06-25 | End: 2025-06-27 | Stop reason: HOSPADM

## 2025-06-25 RX ORDER — ONDANSETRON HYDROCHLORIDE 2 MG/ML
4 INJECTION, SOLUTION INTRAVENOUS ONCE
Status: COMPLETED | OUTPATIENT
Start: 2025-06-25 | End: 2025-06-25

## 2025-06-25 RX ORDER — CLOPIDOGREL BISULFATE 300 MG/1
TABLET, FILM COATED ORAL AS NEEDED
Status: DISCONTINUED | OUTPATIENT
Start: 2025-06-25 | End: 2025-06-25 | Stop reason: HOSPADM

## 2025-06-25 RX ORDER — LABETALOL HYDROCHLORIDE 5 MG/ML
10 INJECTION, SOLUTION INTRAVENOUS EVERY 10 MIN PRN
Status: DISCONTINUED | OUTPATIENT
Start: 2025-06-25 | End: 2025-06-25

## 2025-06-25 RX ORDER — PROCHLORPERAZINE MALEATE 10 MG
10 TABLET ORAL EVERY 6 HOURS PRN
Status: DISCONTINUED | OUTPATIENT
Start: 2025-06-25 | End: 2025-06-27 | Stop reason: HOSPADM

## 2025-06-25 RX ORDER — MIDAZOLAM HYDROCHLORIDE 1 MG/ML
INJECTION, SOLUTION INTRAMUSCULAR; INTRAVENOUS AS NEEDED
Status: DISCONTINUED | OUTPATIENT
Start: 2025-06-25 | End: 2025-06-25 | Stop reason: HOSPADM

## 2025-06-25 RX ORDER — DIPHENHYDRAMINE HYDROCHLORIDE 50 MG/ML
50 INJECTION, SOLUTION INTRAMUSCULAR; INTRAVENOUS ONCE
Status: COMPLETED | OUTPATIENT
Start: 2025-06-25 | End: 2025-06-25

## 2025-06-25 RX ORDER — HALOPERIDOL LACTATE 5 MG/ML
5 INJECTION, SOLUTION INTRAMUSCULAR ONCE
Status: COMPLETED | OUTPATIENT
Start: 2025-06-25 | End: 2025-06-25

## 2025-06-25 RX ADMIN — DEXMEDETOMIDINE HYDROCHLORIDE 0.2 MCG/KG/HR: 400 INJECTION INTRAVENOUS at 20:10

## 2025-06-25 RX ADMIN — HALOPERIDOL LACTATE 5 MG: 5 INJECTION, SOLUTION INTRAMUSCULAR at 17:28

## 2025-06-25 RX ADMIN — LABETALOL HYDROCHLORIDE 10 MG: 5 INJECTION, SOLUTION INTRAVENOUS at 19:08

## 2025-06-25 RX ADMIN — DIPHENHYDRAMINE HYDROCHLORIDE 50 MG: 50 INJECTION INTRAMUSCULAR; INTRAVENOUS at 17:27

## 2025-06-25 RX ADMIN — PROCHLORPERAZINE EDISYLATE 10 MG: 5 INJECTION INTRAMUSCULAR; INTRAVENOUS at 16:08

## 2025-06-25 RX ADMIN — LABETALOL HYDROCHLORIDE 10 MG: 5 INJECTION, SOLUTION INTRAVENOUS at 18:00

## 2025-06-25 RX ADMIN — DIPHENHYDRAMINE HYDROCHLORIDE 50 MG: 50 INJECTION, SOLUTION INTRAMUSCULAR; INTRAVENOUS at 17:27

## 2025-06-25 RX ADMIN — DEXMEDETOMIDINE HYDROCHLORIDE 0.2 MCG/KG/HR: 4 INJECTION, SOLUTION INTRAVENOUS at 20:10

## 2025-06-25 RX ADMIN — IOHEXOL 75 ML: 350 INJECTION, SOLUTION INTRAVENOUS at 16:36

## 2025-06-25 RX ADMIN — NICARDIPINE HYDROCHLORIDE 2.5 MG/HR: 0.2 INJECTION INTRAVENOUS at 18:37

## 2025-06-25 RX ADMIN — ONDANSETRON 4 MG: 2 INJECTION INTRAMUSCULAR; INTRAVENOUS at 15:22

## 2025-06-25 RX ADMIN — HALOPERIDOL LACTATE 5 MG: 5 INJECTION, SOLUTION INTRAMUSCULAR at 18:00

## 2025-06-25 RX ADMIN — Medication 20 MG: at 18:15

## 2025-06-25 RX ADMIN — LORAZEPAM 1 MG: 2 INJECTION INTRAMUSCULAR at 17:29

## 2025-06-25 RX ADMIN — ONDANSETRON HYDROCHLORIDE 4 MG: 2 INJECTION, SOLUTION INTRAVENOUS at 15:22

## 2025-06-25 RX ADMIN — LORAZEPAM 1 MG: 2 INJECTION INTRAMUSCULAR; INTRAVENOUS at 17:29

## 2025-06-25 SDOH — ECONOMIC STABILITY: INCOME INSECURITY: IN THE PAST 12 MONTHS HAS THE ELECTRIC, GAS, OIL, OR WATER COMPANY THREATENED TO SHUT OFF SERVICES IN YOUR HOME?: NO

## 2025-06-25 SDOH — ECONOMIC STABILITY: HOUSING INSECURITY: DO YOU FEEL UNSAFE GOING BACK TO THE PLACE WHERE YOU LIVE?: NO

## 2025-06-25 SDOH — SOCIAL STABILITY: SOCIAL INSECURITY: WITHIN THE LAST YEAR, HAVE YOU BEEN HUMILIATED OR EMOTIONALLY ABUSED IN OTHER WAYS BY YOUR PARTNER OR EX-PARTNER?: NO

## 2025-06-25 SDOH — SOCIAL STABILITY: SOCIAL INSECURITY
WITHIN THE LAST YEAR, HAVE YOU BEEN RAPED OR FORCED TO HAVE ANY KIND OF SEXUAL ACTIVITY BY YOUR PARTNER OR EX-PARTNER?: NO

## 2025-06-25 SDOH — ECONOMIC STABILITY: FOOD INSECURITY: WITHIN THE PAST 12 MONTHS, YOU WORRIED THAT YOUR FOOD WOULD RUN OUT BEFORE YOU GOT THE MONEY TO BUY MORE.: NEVER TRUE

## 2025-06-25 SDOH — SOCIAL STABILITY: SOCIAL INSECURITY: DO YOU FEEL ANYONE HAS EXPLOITED OR TAKEN ADVANTAGE OF YOU FINANCIALLY OR OF YOUR PERSONAL PROPERTY?: NO

## 2025-06-25 SDOH — ECONOMIC STABILITY: FOOD INSECURITY: HOW HARD IS IT FOR YOU TO PAY FOR THE VERY BASICS LIKE FOOD, HOUSING, MEDICAL CARE, AND HEATING?: NOT VERY HARD

## 2025-06-25 SDOH — ECONOMIC STABILITY: HOUSING INSECURITY: AT ANY TIME IN THE PAST 12 MONTHS, WERE YOU HOMELESS OR LIVING IN A SHELTER (INCLUDING NOW)?: NO

## 2025-06-25 SDOH — SOCIAL STABILITY: SOCIAL INSECURITY
WITHIN THE LAST YEAR, HAVE YOU BEEN KICKED, HIT, SLAPPED, OR OTHERWISE PHYSICALLY HURT BY YOUR PARTNER OR EX-PARTNER?: NO

## 2025-06-25 SDOH — SOCIAL STABILITY: SOCIAL INSECURITY: DO YOU FEEL UNSAFE GOING BACK TO THE PLACE WHERE YOU ARE LIVING?: NO

## 2025-06-25 SDOH — SOCIAL STABILITY: SOCIAL INSECURITY: HAVE YOU HAD THOUGHTS OF HARMING ANYONE ELSE?: NO

## 2025-06-25 SDOH — SOCIAL STABILITY: SOCIAL INSECURITY: ABUSE: ADULT

## 2025-06-25 SDOH — SOCIAL STABILITY: SOCIAL INSECURITY: HAS ANYONE EVER THREATENED TO HURT YOUR FAMILY OR YOUR PETS?: NO

## 2025-06-25 SDOH — ECONOMIC STABILITY: FOOD INSECURITY: WITHIN THE PAST 12 MONTHS, THE FOOD YOU BOUGHT JUST DIDN'T LAST AND YOU DIDN'T HAVE MONEY TO GET MORE.: NEVER TRUE

## 2025-06-25 SDOH — ECONOMIC STABILITY: HOUSING INSECURITY: IN THE PAST 12 MONTHS, HOW MANY TIMES HAVE YOU MOVED WHERE YOU WERE LIVING?: 0

## 2025-06-25 SDOH — ECONOMIC STABILITY: HOUSING INSECURITY: IN THE LAST 12 MONTHS, WAS THERE A TIME WHEN YOU WERE NOT ABLE TO PAY THE MORTGAGE OR RENT ON TIME?: NO

## 2025-06-25 SDOH — SOCIAL STABILITY: SOCIAL INSECURITY: WITHIN THE LAST YEAR, HAVE YOU BEEN AFRAID OF YOUR PARTNER OR EX-PARTNER?: NO

## 2025-06-25 SDOH — SOCIAL STABILITY: SOCIAL INSECURITY: ARE THERE ANY APPARENT SIGNS OF INJURIES/BEHAVIORS THAT COULD BE RELATED TO ABUSE/NEGLECT?: NO

## 2025-06-25 SDOH — SOCIAL STABILITY: SOCIAL INSECURITY: HAVE YOU HAD ANY THOUGHTS OF HARMING ANYONE ELSE?: NO

## 2025-06-25 SDOH — SOCIAL STABILITY: SOCIAL INSECURITY: WERE YOU ABLE TO COMPLETE ALL THE BEHAVIORAL HEALTH SCREENINGS?: YES

## 2025-06-25 SDOH — ECONOMIC STABILITY: TRANSPORTATION INSECURITY: IN THE PAST 12 MONTHS, HAS LACK OF TRANSPORTATION KEPT YOU FROM MEDICAL APPOINTMENTS OR FROM GETTING MEDICATIONS?: NO

## 2025-06-25 SDOH — SOCIAL STABILITY: SOCIAL INSECURITY: DOES ANYONE TRY TO KEEP YOU FROM HAVING/CONTACTING OTHER FRIENDS OR DOING THINGS OUTSIDE YOUR HOME?: NO

## 2025-06-25 SDOH — SOCIAL STABILITY: SOCIAL INSECURITY: ARE YOU OR HAVE YOU BEEN THREATENED OR ABUSED PHYSICALLY, EMOTIONALLY, OR SEXUALLY BY ANYONE?: NO

## 2025-06-25 ASSESSMENT — ACTIVITIES OF DAILY LIVING (ADL)
TOILETING: NEEDS ASSISTANCE
TOILETING: INDEPENDENT
WALKS IN HOME: INDEPENDENT
DRESSING YOURSELF: INDEPENDENT
PATIENT'S MEMORY ADEQUATE TO SAFELY COMPLETE DAILY ACTIVITIES?: YES
GROOMING: INDEPENDENT
HEARING - LEFT EAR: FUNCTIONAL
FEEDING YOURSELF: INDEPENDENT
BATHING: INDEPENDENT
FEEDING YOURSELF: INDEPENDENT
HEARING - LEFT EAR: FUNCTIONAL
ADEQUATE_TO_COMPLETE_ADL: YES
GROOMING: INDEPENDENT
BATHING: INDEPENDENT
WALKS IN HOME: INDEPENDENT
JUDGMENT_ADEQUATE_SAFELY_COMPLETE_DAILY_ACTIVITIES: YES
JUDGMENT_ADEQUATE_SAFELY_COMPLETE_DAILY_ACTIVITIES: YES
LACK_OF_TRANSPORTATION: NO
ADEQUATE_TO_COMPLETE_ADL: YES
PATIENT'S MEMORY ADEQUATE TO SAFELY COMPLETE DAILY ACTIVITIES?: YES
HEARING - RIGHT EAR: FUNCTIONAL
HEARING - RIGHT EAR: FUNCTIONAL
DRESSING YOURSELF: INDEPENDENT

## 2025-06-25 ASSESSMENT — LIFESTYLE VARIABLES
AUDIT-C TOTAL SCORE: 0
HOW OFTEN DO YOU HAVE A DRINK CONTAINING ALCOHOL: NEVER
AUDIT-C TOTAL SCORE: 0
HOW OFTEN DO YOU HAVE 6 OR MORE DRINKS ON ONE OCCASION: NEVER
HOW MANY STANDARD DRINKS CONTAINING ALCOHOL DO YOU HAVE ON A TYPICAL DAY: PATIENT DOES NOT DRINK
SKIP TO QUESTIONS 9-10: 1

## 2025-06-25 ASSESSMENT — PAIN SCALES - GENERAL: PAINLEVEL_OUTOF10: 0 - NO PAIN

## 2025-06-25 ASSESSMENT — COGNITIVE AND FUNCTIONAL STATUS - GENERAL
PATIENT BASELINE BEDBOUND: NO
DAILY ACTIVITIY SCORE: 24
MOBILITY SCORE: 24

## 2025-06-25 ASSESSMENT — PATIENT HEALTH QUESTIONNAIRE - PHQ9
2. FEELING DOWN, DEPRESSED OR HOPELESS: NOT AT ALL
SUM OF ALL RESPONSES TO PHQ9 QUESTIONS 1 & 2: 0
1. LITTLE INTEREST OR PLEASURE IN DOING THINGS: NOT AT ALL

## 2025-06-25 ASSESSMENT — COLUMBIA-SUICIDE SEVERITY RATING SCALE - C-SSRS
2. HAVE YOU ACTUALLY HAD ANY THOUGHTS OF KILLING YOURSELF?: NO
1. IN THE PAST MONTH, HAVE YOU WISHED YOU WERE DEAD OR WISHED YOU COULD GO TO SLEEP AND NOT WAKE UP?: NO
6. HAVE YOU EVER DONE ANYTHING, STARTED TO DO ANYTHING, OR PREPARED TO DO ANYTHING TO END YOUR LIFE?: NO

## 2025-06-25 ASSESSMENT — PAIN - FUNCTIONAL ASSESSMENT
PAIN_FUNCTIONAL_ASSESSMENT: CPOT (CRITICAL CARE PAIN OBSERVATION TOOL)
PAIN_FUNCTIONAL_ASSESSMENT: 0-10

## 2025-06-25 NOTE — Clinical Note
Angioplasty of the right coronary artery lesion. Inflation 1: Pressure = 12 ebonie; Duration = 10 sec. Inflation 2: Pressure = 8 ebonie; Duration = 10 sec.

## 2025-06-25 NOTE — CONSULTS
"Consults  History Of Present Illness:    David Caldwell is a 79 y.o. male presenting with stroke after PCI.    Patient known to me as an outpatient.  Presented to the office on 6/19/2025 with increasing shortness of breath concerning for anginal equivalent.    Patient has a history of myasthenia gravis, permanent atrial fibrillation, CAD with PCI to the RCA 2021, known occluded LAD, dyslipidemia, left thalamic CVA, hypertension and dyslipidemia.  He was brought electively to the Cath Lab this morning and underwent PCI to a severely diseased proximal RCA.  There was transient bradycardia when he first underwent angioplasty of the stenotic lesion however this resolved spontaneously.  The remainder of the procedure went without issue.  At the termination of the procedure I showed him the films and we discussed the findings.  He had no neurological deficits at that time.    I went to the control room to finish his orders and I heard the nursing staff asking him several questions.  I walked in and found that before they could transfer him from the exam table to his bed that he appeared confused.  He was able to move all extremities upon command however had garbled speech with no facial droop.  He had normal strength in the extremities.  Initially we were asking if he had any pain and he would respond \"yes\" to these questions.  Started to ask open-ended questions to which he continue to respond \"yes.\"  It was apparent that he was not responding appropriately.  He started using words that did not make sense.  A brain attack was called.    Blood sugar was checked and was 74.  He was given an amp of D50.  There is no improvement of symptoms.  He was sent to CT scan.  CT scan demonstrated no obvious findings.  He was brought to a room in the CICU stepdown area.  His symptoms resolved.  His family was there.  I went back to talk to the patient and he started having neurologic findings again.  At first he appeared " uncomfortable and was reaching for the emesis bag.  We assume that he was nauseous however he started answering questions and appropriately again.  Basic neurologic exam was similar to what he was doing in the Cath Lab.  Another brain attack was called.  Neurology evaluated the patient.  He was sent back to CT scan which demonstrated no acute findings and he was given TNK.    Of note the patient did receive his clopidogrel 600 mg by mouth when his initial symptoms resolved.    He has been on Xarelto but it was held 2 days prior to the catheterization.     Last Recorded Vitals:  Vitals:    06/25/25 1855 06/25/25 1900 06/25/25 1908 06/25/25 1932   BP: (!) 198/94 (!) 187/115 (!) 188/100 134/74   Patient Position:       Pulse:  80 96    Resp:  (!) 28     Temp:       TempSrc:       SpO2:  97%     Weight:       Height:           Last Labs:  CBC - 6/25/2025:  2:01 PM  6.4 13.8 180    44.2      CMP - 6/25/2025:  2:01 PM  9.0 7.3 22 --- 1.0   _ 3.7 8 61      PTT - 6/25/2025:  2:34 PM  1.2   13.1 34     BNP   Date/Time Value Ref Range Status   09/04/2021 06:51  (H) 0 - 99 pg/mL Final     Comment:     .  <100 pg/mL - Heart failure unlikely  100-299 pg/mL - Intermediate probability of acute heart  .               failure exacerbation. Correlate with clinical  .               context and patient history.    >=300 pg/mL - Heart Failure likely. Correlate with clinical  .               context and patient history.  BNP testing is performed using different testing   methodology at [facility] than at other   [facility] hospitals. Direct result comparisons should   only be made within the same method.       Hemoglobin A1C   Date/Time Value Ref Range Status   12/18/2023 10:31 AM 5.3 see below % Final   11/28/2023 03:29 AM 5.5 4.3 - 5.6 % Final     Comment:     American Diabetes Association guidelines indicate that patients with HgbA1c in the range 5.7-6.4% are at increased risk for development of diabetes, and  intervention by lifestyle modification may be beneficial. HgbA1c greater or equal to 6.5% is considered diagnostic of diabetes.   09/05/2021 06:12 AM 5.1 % Final     Comment:          Diagnosis of Diabetes-Adults   Non-Diabetic: < or = 5.6%   Increased risk for developing diabetes: 5.7-6.4%   Diagnostic of diabetes: > or = 6.5%  .       Monitoring of Diabetes                Age (y)     Therapeutic Goal (%)   Adults:          >18           <7.0   Pediatrics:    13-18           <7.5                   7-12           <8.0                   0- 6            7.5-8.5   American Diabetes Association. Diabetes Care 33(S1), Jan 2010.       LDL Calculated   Date/Time Value Ref Range Status   12/20/2024 11:45  (H) <=99 mg/dL Final     Comment:                                 Near   Borderline      AGE      Desirable  Optimal    High     High     Very High     0-19 Y     0 - 109     ---    110-129   >/= 130     ----    20-24 Y     0 - 119     ---    120-159   >/= 160     ----      >24 Y     0 -  99   100-129  130-159   160-189     >/=190     12/18/2023 10:31  (H) <=99 mg/dL Final     Comment:                                 Near   Borderline      AGE      Desirable  Optimal    High     High     Very High     0-19 Y     0 - 109     ---    110-129   >/= 130     ----    20-24 Y     0 - 119     ---    120-159   >/= 160     ----      >24 Y     0 -  99   100-129  130-159   160-189     >/=190       VLDL   Date/Time Value Ref Range Status   12/20/2024 11:45 AM 15 0 - 40 mg/dL Final   12/18/2023 10:31 AM 15 0 - 40 mg/dL Final   04/11/2023 10:45 AM 13 0 - 40 mg/dL Final   04/13/2022 09:46 AM 15 0 - 40 mg/dL Final   12/11/2021 11:00 AM 22 0 - 40 mg/dL Final      Last I/O:  I/O last 3 completed shifts:  In: - (0 mL/kg)   Out: 5 (0.1 mL/kg) [Blood:5]  Weight: 80.3 kg     Past Cardiology Tests (Last 3 Years):  EKG:  ECG 12 Lead 06/25/2025 (Preliminary)      ECG 12 lead 01/09/2025      ECG 12 lead (Clinic Performed)  "04/09/2024    Echo:  No results found for this or any previous visit from the past 1095 days.    Ejection Fractions:  No results found for: \"EF\"  Cath:  Cardiac Catheterization Procedure 06/25/2025    Stress Test:  Nuclear Stress Test 10/25/2024    Cardiac Imaging:  No results found for this or any previous visit from the past 1095 days.      Past Medical History:  He has a past medical history of CAD (coronary artery disease), Hyperlipidemia, Hypertension, MI (myocardial infarction) (Multi), Myasthenia gravis, and Nephrolithiasis.    Past Surgical History:  He has a past surgical history that includes Other surgical history (09/14/2020); Coronary angioplasty with stent; Cardiac catheterization (N/A, 6/25/2025); Cardiac catheterization (N/A, 6/25/2025); and Cardiac catheterization (N/A, 6/25/2025).      Social History:  He reports that he has never smoked. He has never used smokeless tobacco. He reports that he does not currently use alcohol after a past usage of about 1.0 standard drink of alcohol per week. He reports that he does not currently use drugs.    Family History:  Family History[1]     Allergies:  Morphine, Onion, Opioids-meperidine and related, Penicillins, Rosuvastatin, and Opioids - morphine analogues    Inpatient Medications:  Scheduled Medications[2]  PRN Medications[3]  Continuous Medications[4]  Outpatient Medications:  Current Outpatient Medications   Medication Instructions    amLODIPine (NORVASC) 5 mg, oral, Daily    azaTHIOprine (Imuran) 50 mg tablet 2 tablets, Daily    Xarelto 20 mg, oral, Daily with evening meal       Physical Exam:  General: Alert and oriented to self and in mild distress  HEENT: JVP normal.   Cardiovascular: S1, S2, regular.  Respiratory: Clear to auscultation bilaterally  Lower extremities: Warm. Good distal pulses. No edema.       Assessment/Plan   1) CAD: PCI to the RCA today with 1 drug-eluting stent.  Received to 600 mg of clopidogrel.  Recommendation would be for " aspirin 81 mg daily and clopidogrel 75 mg daily.  The aspirin would be recommended for 1 month and the clopidogrel for 3 months.  Once it is okay to resume his Xarelto he would be asked to resume Xarelto 20 mg daily.    2) atrial fibrillation: Rates controlled.  Once again would resume Xarelto when safe.    3) CVA: Continue care as per neurology.    4) dyslipidemia: Intolerant to statins (myalgias with rosuvastatin, atorvastatin, and pravastatin).  Also myalgias with ezetimibe.  Concerned with the use of PCSK9 inhibitors given his myasthenia gravis.  Can consider the use of Nexletol.    5) hypertension: On home amlodipine 5 mg daily.  Can resume when able.       Code Status:  Full Code    I spent 60 minutes in the professional and overall care of this patient.        Eduardo Alegre MD       [1] No family history on file.  [2]   Scheduled medications   Medication Dose Route Frequency    [START ON 6/26/2025] amLODIPine  5 mg oral Daily    [Held by provider] aspirin  81 mg oral Daily    Or    [Held by provider] aspirin  81 mg oral Daily    Or    [Held by provider] aspirin  81 mg nasogastric tube Daily    Or    [Held by provider] aspirin  300 mg rectal Daily    azaTHIOprine  100 mg oral Daily    haloperidol lactate        haloperidol lactate  5 mg intravenous Once    ondansetron        ondansetron  4 mg intravenous Once    perflutren lipid microspheres  0.5-10 mL of dilution intravenous Once in imaging    perflutren lipid microspheres  0.5-10 mL of dilution intravenous Once in imaging    perflutren protein A microsphere  0.5 mL intravenous Once in imaging    perflutren protein A microsphere  0.5 mL intravenous Once in imaging    polyethylene glycol  17 g oral Daily    sulfur hexafluoride microsphr  2 mL intravenous Once in imaging    sulfur hexafluoride microsphr  2 mL intravenous Once in imaging    tenecteplase (TNKASE) intravenous for stroke  0.25 mg/kg intravenous Once   [3]   PRN medications   Medication     haloperidol lactate    labetaloL    labetaloL    ondansetron    oxygen    oxygen    polyethylene glycol    prochlorperazine    Or    prochlorperazine    Or    prochlorperazine   [4]   Continuous Medications   Medication Dose Last Rate    niCARdipine  0-15 mg/hr 10 mg/hr (06/25/25 1932)    sodium chloride 0.9%  100 mL/hr

## 2025-06-25 NOTE — SIGNIFICANT EVENT
Rapid response was called for another brain attack.  Case was discussed with neurology here patient was seen and evaluated and he was trying to get out of the bed and not verbal.  He was not directable either.  Extensive discussion took place between neurology here at side as well as teleneurology and plan was to get MRI with wake-up stroke.  Patient already got CT head along with CTA head and neck which did not show any acute pathology.  Due to patient's ongoing condition discussion was made to administer TN.  Case was discussed with Dr. PRIETO.  Patient has orders for TNK.  Will continue to treat his blood pressure and get him transferred to ICU attending Dr. Paige who accepted the patient.    Total critical care time: Approximately 55 min  Due to a high probability of clinically significant, life threatening deterioration, the patient required my highest level of preparedness to intervene emergently and I personally spent this critical care time directly and personally managing the patient. This critical care time included obtaining a history; examining the patient; pulse oximetry; ordering and review of studies; arranging urgent treatment with development of a management plan; evaluation of patient's response to treatment; frequent reassessment; and, discussions with other providers.  This critical care time was performed to assess and manage the high probability of imminent, life-threatening deterioration that could result in multi-organ failure. It was exclusive of separately billable procedures and treating other patients and teaching time.

## 2025-06-25 NOTE — Clinical Note
Angioplasty of the right coronary artery lesion. Inflation 1: Pressure = 4 ebonie; Duration = 10 sec.

## 2025-06-25 NOTE — PROGRESS NOTES
PHARMACY STROKE RESPONSE      Patient Name: David Caldwell  MRN: [id number]  Location: 2108/2108-A    Patient Weight (kg):   Wt Readings from Last 1 Encounters:   06/25/25 80.2 kg (176 lb 12.9 oz)        An acute Brain Attack has been activated, pharmacy participated in multidisciplinary team bedside response for David Caldwell.  Contraindications for fibrinolytic therapy have been reviewed by pharmacy and any issues relating to medication therapy have been discussed directly with the provider(s) caring for this patient.     Pharmacy aided in the procurement, bedside response for fibrinolytic therapy. Patient did not receive fibrinolysis.    Orders Placed This Encounter      OR Linked Order Group          aspirin EC tablet 81 mg          aspirin chewable tablet 81 mg          aspirin chewable tablet 81 mg          aspirin suppository 300 mg      labetaloL (Normodyne,Trandate) injection 10 mg      oxygen (O2) therapy      polyethylene glycol (Glycolax, Miralax) packet 17 g      Thank you for allowing me to take part in the care of this patient.     SEBASTIAN BANSAL  6/25/2025  1:43 PM         References:    Neurological [place] Stroke Tools   Neurological [place] IV Thrombolysis Checklist

## 2025-06-25 NOTE — Clinical Note
Angioplasty of the right coronary artery lesion. Inflation 1: Pressure = 16 ebonie; Duration = 10 sec. Inflation 2: Pressure = 22 ebonie; Duration = 10 sec. Inflation 3: Pressure = 24 ebonie; Duration = 10 sec. Inflation 4: Pressure = 24 ebonie; Duration = 10 sec.

## 2025-06-25 NOTE — PROGRESS NOTES
PHARMACY STROKE RESPONSE      Patient Name: David Caldwell  MRN: [id number]  Location: 2108/2108    Patient Weight (kg):   Wt Readings from Last 1 Encounters:   06/25/25 80.3 kg (177 lb 0.5 oz)        An acute Brain Attack has been activated, pharmacy participated in multidisciplinary team bedside response for David Caldwell.  Contraindications for fibrinolytic therapy have been reviewed by pharmacy and any issues relating to medication therapy have been discussed directly with the provider(s) caring for this patient.     Pharmacy aided in the procurement, preparation, facilitation, bedside response for fibrinolytic therapy, antihypertensive medication(s). Patient did fibrinolysis. Tenecteplase (TNKase) Dose administered:  20 mg.    Orders Placed This Encounter      amLODIPine (Norvasc) tablet 5 mg      OR Linked Order Group          aspirin EC tablet 81 mg          aspirin chewable tablet 81 mg          aspirin chewable tablet 81 mg          aspirin suppository 300 mg      azaTHIOprine (Imuran) tablet 100 mg      diphenhydrAMINE (BENADryl) injection 50 mg      diphenhydrAMINE (BENADryl) injection 50 mg/mL  - [employer] Override Pull      haloperidol lactate (Haldol) injection 5 mg      haloperidol lactate (Haldol) injection 5 mg      haloperidol lactate (Haldol) injection 5 mg/mL  - Omnicell Override Pull      haloperidol lactate (Haldol) injection 5 mg/mL  - Omnicell Override Pull      iohexol (OMNIPaque) 350 mg iodine/mL solution 75 mL      labetaloL (Normodyne,Trandate) injection 10 mg      labetaloL (Normodyne,Trandate) injection 10 mg      LORazepam (Ativan) injection 2 mg/mL  - [employer]ell Override Pull      niCARdipine (Cardene) 40 mg in sodium chloride 200 mL (0.2 mg/mL) infusion (premix)      ondansetron (Zofran) injection 4 mg      ondansetron (Zofran) injection 4 mg/2 mL  - [employer] Override Pull      oxygen (O2) therapy      oxygen (O2) therapy      perflutren lipid microspheres (Definity) injection  0.5-10 mL of dilution      perflutren protein A microsphere (Optison) injection 0.5 mL      polyethylene glycol (Glycolax, Miralax) packet 17 g      polyethylene glycol (Glycolax, Miralax) packet 17 g      OR Linked Order Group          prochlorperazine (Compazine) tablet 10 mg          prochlorperazine (Compazine) injection 10 mg          prochlorperazine (Compazine) suppository 25 mg      sodium chloride 0.9% infusion      sulfur hexafluoride microsphr (Lumason) injection 24.28 mg      tenecteplase (TNKASE) injection for STROKE 20 mg      Thank you for allowing me to take part in the care of this patient.     Shukri Juarez, PharmD  6/25/2025  6:18 PM         References:    Neurological [place] Stroke Tools  [facility] IV Thrombolysis Checklist

## 2025-06-25 NOTE — POST-PROCEDURE NOTE
Physician Transition of Care Summary  Invasive Cardiovascular Lab    Procedure Date: 6/25/2025  Attending:    * Eduardo Alegre - Primary  Resident/Fellow/Other Assistant: Surgeons and Role:  * No surgeons found with a matching role *    Indications:   Pre-op Diagnosis      * Unstable angina (Multi) [I20.0]    Post-procedure diagnosis:   Post-op Diagnosis     * Unstable angina (Multi) [I20.0]    Procedure(s):   [facility], With LV  02076 - IN CATH PLMT L HRT & ARTS W/NJX & ANGIO IMG S&I        Procedure Findings:   Severe disease of the proximal to mid RCA in this right dominant system.  Persistent chronic total occlusion of the LAD with left to left and right to left collaterals.  Moderate disease of the circumflex.  Normal LVEDP without evidence of aortic stenosis.    OCT mediated PCI of the RCA with Synergy 4 x 38 mm KENYA.  Post dilation with 4 mm, 6 mm, and 5 mm NC balloons.    Description of the Procedure:   Right radial 5/6 Thai slender sheath.  Zeke catheter to cannulate the left and right coronary systems.  IR guided.  4000 units of heparin.  Run-through wire placed into the RCA.  Predilation with a 2.5 x 20 mm balloon followed by OCT.  Then stenting with A Synergy 4 x 38 mm KENYA.  Post dilation with 4 mm, 6 mm, and 5 mm NC balloons.  Final OCT run demonstrated good apposition with well opposed stent and no dissection.    TR band placed at the end of the procedure.    Complications:   None    Stents/Implants:   Implants          Stent          Stent, Synergy Xd, Mr Us, 4.00 X 38mm - [id number] - Implanted        Inventory item: STENT, SYNERGY XD, MR US, 4.00 X 38MM Model/Cat number: [id number]    : [employer] Lot number: [id number]    Device identifier: [id number]        GUDID Information       Request status Successful        Brand name: SYNERGY™ XD Version/Model: [id number]    Company name: [employer] MRI safety info as of 6/25/25: MR [place]    Contains  dry or latex rubber: No      GMDN P.T. name: Drug-eluting coronary artery stent, non-bioabsorbable-polymer-coated                As of 6/25/2025       Status: Implanted                              Anticoagulation/Antiplatelet Plan:   Patient was given 600 mg of clopidogrel and 325 mg of aspirin.    Plan will be for clopidogrel 75 mg for 3 months, aspirin 81 mg for 1 month, and continue Xarelto 20 mg daily.    Estimated Blood Loss:   * No values recorded between 6/25/2025 10:55 AM and 6/25/2025 12:18 PM *    Anesthesia: Moderate Sedation Anesthesia Staff: No anesthesia staff entered.    Any Specimen(s) Removed:   Order Name Source Comment Collection Info Order Time   BASIC METABOLIC PANEL Blood, Venous  Collected By: Mel Martinez RN 6/25/2025  9:36 AM     Release result to [employer]   Immediate        CBC Blood, Venous  Collected By: Mel Martinez RN 6/25/2025  9:36 AM     Release result to [employer]   Immediate            Disposition:   Patient will be transferred to the holding area and discharged home later today.      Electronically signed by: Eduardo Alegre MD, 6/25/2025 12:18 PM

## 2025-06-25 NOTE — H&P
Critical Care Medicine History and Physical        Subjective   Patient is a 79 y.o. male admitted on 6/25/2025  9:29 AM to the ICU for post TNK administration monitoring after stroke.    Patient is a 79 y.o. male with a history of myasthenia gravis, permanent atrial fibrillation, CAD (PCI of RCA 2021 in the setting of NSTEMI, LAD noted to be occluded), dyslipidemia, CVA (thalamic stroke), and hypertension presented to [facility] cath lab for OCT mediated PCI of the RCA with Dr. Alegre today for unstable ungina. After procedure patient experiencing difficulty speaking and following commands, brain attack. CT obtained indicating no acute intercranial hemorrhage or mass effect. Tele Neuro stroke team was consulted along with inpatient Neurology consult placed. Patient since has recovered. He denies any chief complaints.  Patient admitted to medicine service earlier in the day.    Brain attack was called again approximately around 1750 for concern of reemergence of strokelike symptoms.  Patient was nonverbal.  Patient was not directable.  Patient was reevaluated by neurology on-call Dr. Patel as well as teleneurology who plan to get MRI for wake-up stroke protocol.  Patient recently CT head negative.  CTA head and neck showed no LVO.  After shared decision making, neurology recommended TNK administration.  Patient was transferred to ICU from stepdown unit.  Patient blood pressure prior to TNK administration was 130s systolic.  TNK was administered at 1815.  Patient admitted to the ICU for post TNK monitoring.        Medical History[1]  Surgical History[2]  Prescriptions Prior to Admission[3]  Morphine, Onion, Opioids-meperidine and related, Penicillins, Rosuvastatin, and Opioids - morphine analogues  Social History[4]  Family History[5]    Scheduled Medications:   Scheduled Medications[6]     Continuous Medications:   Continuous Medications[7]     PRN Medications:   PRN Medications[8]    Objective    Vitals:  24hr Min/Max:  Temp  Min: 36.7 °C (98.1 °F)  Max: 36.8 °C (98.2 °F)  Pulse  Min: 50  Max: 81  BP  Min: 159/80  Max: 187/94  Resp  Min: 14  Max: 29  SpO2  Min: 97 %  Max: 100 %    Physical exam:    Physical Exam  HENT:      Head: Normocephalic and atraumatic.      Nose: Nose normal.      Mouth/Throat:      Mouth: Mucous membranes are moist.      Pharynx: Oropharynx is clear.   Eyes:      Extraocular Movements: Extraocular movements intact.      Conjunctiva/sclera: Conjunctivae normal.      Pupils: Pupils are equal, round, and reactive to light.   Cardiovascular:      Rate and Rhythm: Normal rate and regular rhythm.      Pulses: Normal pulses.      Heart sounds: Normal heart sounds.   Pulmonary:      Effort: Pulmonary effort is normal.      Breath sounds: Normal breath sounds.   Abdominal:      General: Abdomen is flat. Bowel sounds are normal.      Palpations: Abdomen is soft.   Musculoskeletal:         General: Normal range of motion.      Cervical back: Normal range of motion and neck supple.   Skin:     General: Skin is warm and dry.      Capillary Refill: Capillary refill takes less than 2 seconds.   Neurological:      Motor: No weakness.      Gait: Gait normal.      Comments: Confused patient, ANO x 0.         Assessment/Plan   Assessment/Plan   Overall assessment:  Patient is a 79 y.o. male with a history of myasthenia gravis, permanent atrial fibrillation, CAD (PCI of RCA 2021 in the setting of NSTEMI, LAD noted to be occluded), dyslipidemia, CVA (thalamic stroke), and hypertension admitted to ICU for post TNK monitoring.      Neuro: #CVA (primarily aphasia)  - History: History of thalamic stroke  - Home meds: N/A  - Interventions: TNK administered at 1815.  - CAM ICU  - Every hour neurochecks  - Initial CT head and CTA obtained earlier this morning prior to medicine admission showed no acute abnormality, no LVO, no acute bleed.  MRI was attempted after second brain attack was called on patient,  however cannot be completed.  - CT scan scheduled for 24 hours post TNK administration for stability evaluation.  - NIH of 3 for dysarthria, aphasia, not oriented prior to TNK administration.    Cardiovascular: #A-fib on Xarelto, CAD (PCI of RCA 2021 in the setting of NSTEMI, LAD noted to be occluded), HTN  - Goals:  [MAP> 65, HR ]  - Home meds: Amlodipine, Xarelto  - Last echo: 9/5/2021: LVEF of 55 to 60%, mild diastolic dysfunction  - Interventions: Cardene ordered as needed for SBP less than 180.  - Holding antiplatelets, anticoagulants after TNK administration.  - New echo pending for this admission.    Pulmonary: CHINO      Continuous pulse oximetry   O2 PRN to maintain SpO2 > 94%, wean as tolerated  - Imaging: N/A      Gastrointestinal: CHINO  Results from last 7 days   Lab Units 06/25/25  1434 06/25/25  1401   INR  1.2*  --    APTT seconds 34  --    ALK PHOS U/L  --  61   AST U/L  --  22   ALT U/L  --  8*     - Last BM:  N/A  - Additional interventions: N/A    Renal: CHINO  Results from last 7 days   Lab Units 06/25/25  1401 06/25/25  0947   SODIUM mmol/L 134* 138   POTASSIUM mmol/L 4.3 4.2   CHLORIDE mmol/L 105 105   MAGNESIUM mg/dL 2.20  --    CALCIUM mg/dL 9.0 9.3   CO2 mmol/L 21 25   BUN mg/dL 14 15   CREATININE mg/dL 0.88 0.95     Net IO Since Admission: -5 mL [06/25/25 1804]  - Daily CMP,Mg,Phos  - Strict I/O   - Electrolytes: Replete per protocol, goal K>4 Phos >3 Mg >2    Endocrine: CHINO  Results from last 7 days   Lab Units 06/25/25  1530 06/25/25  1401 06/25/25  0947   POCT GLUCOSE mg/dL 95  --   --    GLUCOSE mg/dL  --  140* 95      -BG < 180 goal    Hematology: CHINO  Results from last 7 days   Lab Units 06/25/25  1401 06/25/25  0947   HEMOGLOBIN g/dL 13.8 13.7   HEMATOCRIT % 44.2 41.3   PLATELETS AUTO x10*3/uL 180 202     - Daily CBC  - DVT Prophylaxis: SCDs, holding chemical anticoagulants due to recent TNK administration    Infectious Disease: CHINO  Results from last 7 days   Lab Units  25  1401 25  0947   WBC AUTO x10*3/uL 6.4 5.7     Temp (24hrs), Av.8 °C (98.2 °F), Min:36.7 °C (98.1 °F), Max:36.8 °C (98.2 °F)     No indication for IV antibiotics at this time.  Patient afebrile, nontachycardic.    Musculoskeletal/integumentary: CHINO  - PT to see   - OT to see       LDA:         CODE STATUS: Full Code    Disposition: Patient remain in ICU for post TNK monitoring, 24-hour stability scan.    RESTRAINTS: type: I agree with nursing assessment of the patient´s need for restraints to protect the patient from injury and facilitate healing. The patient is unable to cooperate with the plan of care and at risk for disrupting critical therapy (i.e., removing medical devices, lines, tubes and/or dressings).  Please see order for specifics. Restraints can be removed when the patient is able to cooperate with plan of care and allow healing to occur, or the medical devices at risk are discontinued by the medical team.    ABCDEF Checklist  Analgesia: Spontaneous awakening trial to be pursued if clinically appropriate. RASS goal reviewed  Breathing: Spontaneous breathing trial to be pursued if clinically appropriate. Mechanical power of assisted ventilation reviewed  Choice of analgesia/sedation: Analgesic and sedative agents adjusted per clinical context.   Delirium assessed by CAM, will avoid exacerbating factors  Early mobility and exercise: Physical and occupational therapy engaged  Family: Plan of care, overall trajectory of patient shared with family. Questions elicited and answered as appropriate.     Due to the high probability of life threatening clinical decompensation, the patient required critical care time evaluating and managing this patient.  Critical care time included obtaining a history, examining the patient, ordering and reviewing studies, discussing, developing, and implementing a management plan, evaluating the patient's response to treatment, and discussion with other care  team providers. I saw and evaluated the patient myself.  Critical care time was performed exclusive of billable procedures.    Case discussed with attending , Dr. Paige.  Gareth Jeong MD          [1]   Past Medical History:  Diagnosis Date    CAD (coronary artery disease)     Hyperlipidemia     Hypertension     MI (myocardial infarction) (Multi)     Myasthenia gravis     Nephrolithiasis    [2]   Past Surgical History:  Procedure Laterality Date    CARDIAC CATHETERIZATION N/A 6/25/2025    Procedure: LHC, With LV;  Surgeon: Eduardo Alegre MD;  Location: [facility] Cardiac Cath Lab;  Service: Cardiovascular;  Laterality: N/A;    CARDIAC CATHETERIZATION N/A 6/25/2025    Procedure: PCI KENYA Stent- Coronary;  Surgeon: Eduardo Alegre MD;  Location: [facility] Cardiac Cath Lab;  Service: Cardiovascular;  Laterality: N/A;    CARDIAC CATHETERIZATION N/A 6/25/2025    Procedure: OCT (Optical Coherence Tomography);  Surgeon: Eduardo Alegre MD;  Location: [facility] Cardiac Cath Lab;  Service: Cardiovascular;  Laterality: N/A;    CORONARY ANGIOPLASTY WITH STENT PLACEMENT      2021    OTHER SURGICAL HISTORY  09/14/2020    Cholecystectomy   [3]   Medications Prior to Admission   Medication Sig Dispense Refill Last Dose/Taking    amLODIPine (Norvasc) 5 mg tablet Take 1 tablet (5 mg) by mouth once daily. 90 tablet 3 6/25/2025 Morning    azaTHIOprine (Imuran) 50 mg tablet Take 2 tablets (100 mg) by mouth once daily.   6/24/2025    Xarelto 20 mg tablet Take 1 tablet (20 mg) by mouth once daily in the evening. Take with meals. 90 tablet 3 6/23/2025   [4]   Social History  Tobacco Use    Smoking status: Never    Smokeless tobacco: Never   Vaping Use    Vaping status: Never Used   Substance Use Topics    Alcohol use: Not Currently     Alcohol/week: 1.0 standard drink of alcohol     Types: 1 Cans of beer per week    Drug use: Not Currently   [5] No family history on file.  [6] [START ON 6/26/2025] amLODIPine, 5 mg, oral, Daily  [Held by provider]  aspirin, 81 mg, oral, Daily   Or  [Held by provider] aspirin, 81 mg, oral, Daily   Or  [Held by provider] aspirin, 81 mg, nasogastric tube, Daily   Or  [Held by provider] aspirin, 300 mg, rectal, Daily  azaTHIOprine, 100 mg, oral, Daily  haloperidol lactate, , ,   haloperidol lactate, 5 mg, intravenous, Once  ondansetron, , ,   ondansetron, 4 mg, intravenous, Once  perflutren lipid microspheres, 0.5-10 mL of dilution, intravenous, Once in imaging  perflutren protein A microsphere, 0.5 mL, intravenous, Once in imaging  polyethylene glycol, 17 g, oral, Daily  sulfur hexafluoride microsphr, 2 mL, intravenous, Once in imaging  tenecteplase (TNKASE) intravenous for stroke, 0.25 mg/kg, intravenous, Once  [7] sodium chloride 0.9%, 100 mL/hr  [8] PRN medications: haloperidol lactate, labetaloL, labetaloL, ondansetron, oxygen, oxygen, polyethylene glycol, prochlorperazine **OR** prochlorperazine **OR** prochlorperazine

## 2025-06-25 NOTE — ASSESSMENT & PLAN NOTE
-Cardiology consult placed to Dr. Alegre-per note-Plan will be for clopidogrel 75 mg for 3 months, aspirin 81 mg for 1 month, and continue Xarelto 20 mg daily. Per neurology note (Okay to continue dAPT)  -Echo ordered  -Cont telemetry   -600 mg of clopidogrel and 325 mg of aspirin.   
-Received aspirin in cath lab  -Tele Neuro consult placed while patient in cath lab  -Ct brain attack obtained  No acute intercranial hemorrhage or mass effect. No midline shift  -In patient consult placed to Neurology-would like MRI brain and MRA head and neck. Okay to continue dAPT   -Neuro checks q 4 hours  -RN bedside swallow  -Speech evaluation/language/aphasia  -Echo pending  
-Resume Norvasc  
-Resume patient medications  
-resume patient xarelto     
2019: [id number]

## 2025-06-25 NOTE — PROGRESS NOTES
Speech-Language Pathology                 Therapy Communication Note    Patient Name: David Caldwell  MRN: [id number]  Department: [facility]  Room: 2108/2108-A  Today's Date: 6/25/2025     Discipline: Speech Language Pathology    Missed Visit Reason: Re-attempted patient's speech/language evaluation. Patient currently in MRI. Spoke to DONYA Ojeda, who reports patient will likely be transferred to ICU due to change in medical status. The plan is, then, to see patient for his speech/language evaluation tomorrow 6/26/25. RN in agreement.     Missed Time: 0015

## 2025-06-25 NOTE — PROGRESS NOTES
Speech-Language Pathology                 Therapy Communication Note    Patient Name: David Caldwell  MRN: [id number]  Department: [email or web address] MRI  Room: 2108/2108-A  Today's Date: 6/25/2025     Discipline: Speech Language Pathology    Missed Visit Reason: Attempted to see patient x2 for his speech/language evaluation. Patient unavailable both times (initially, he was in CT; currently, RN Rusty is in with patient). ST services, then, could not be delivered. The plan is to complete patient's speech/language evaluation pending his availability.     Missed Time: 9317

## 2025-06-25 NOTE — Clinical Note
Angioplasty of the right coronary artery lesion. Inflation 1: Pressure = 14 ebonie; Duration = 10 sec.

## 2025-06-25 NOTE — CONSULTS
"Inpatient consult to Neurology  Consult performed by: Delvin Patel MD  Consult ordered by: PEYTON Arnold-SUSSY          History Of Present Illness  David Caldwell is a 79 y.o. male presenting with code BAT.    The patient presented for cardiac catherization.  He underwent the procedure and stent was placed.  Following the procedure, he developed confusion and difficulty speaking.  Per RN, he was not responding to questions.  Code BAT was called.  The patient has since recovered.  He denies any double vision, loss of peripheral vision, slurred speech, facial droop, weakness, numbness, or loss of balance.     Last known well: 11 AM  Had stroke symptoms resolved at time of presentation: Yes  Past Medical History  Medical History[1]  Surgical History  Surgical History[2]  Social History  Social History[3]  Allergies  Morphine, Onion, Opioids-meperidine and related, Penicillins, Rosuvastatin, and Opioids - morphine analogues  Home Medications  Prescriptions Prior to Admission[4]    Review of Systems  Neurological Exam  Physical Exam  Last Recorded Vitals  Blood pressure 159/80, pulse 56, resp. rate 14, height 1.778 m (5' 10\"), weight 80.2 kg (176 lb 12.9 oz), SpO2 98%.    Gen: NAD  Neuro:  --HIF: A&O X 3, repetition and naming intact  --CN:  PERRLA, EOMI, VFF, no visible facial asymmetry, facial sensation intact, no tongue or palatal deviation, SCM intact  --Motor: Moves all 4 extremities equally; no focal deficits  --Sensory: Intact to light touch, intact to pinprick  --Reflex: 2+ symmetric, toes down  --Cerebellum: FTN and HTS intact  --Gait: Deferred       NIHSS        Relevant Results      NIH Stroke Scale  1A. Level of Consciousness: Alert, Keenly Responsive  1B. Ask Month and Age: 1 Question Right  1C. Blink Eyes & Squeeze Hands: Performs Both Tasks  2. Best Gaze: Normal  3. Visual: No Visual Loss  4. Facial Palsy: Normal Symmetrical Movements  5A. Motor - Left Arm: No Drift  5B. Motor - Right Arm: " No Drift  6A. Motor - Left Leg: No Drift  6B. Motor - Right Leg: No Drift  7. Limb Ataxia: Absent  8. Sensory Loss: Normal  9. Best Language: Mild-to-Moderate Aphasia  10. Dysarthria: Mild-to-Moderate Dysarthria  11. Extinction and Inattention: No Abnormality  NIH Stroke Scale: 3                            No MRI head results found for the past 14 days  CT brain attack head wo IV contrast  Result Date: 6/25/2025  Interpreted By:  Shimon Bliss, STUDY: CT BRAIN ATTACK HEAD WO IV CONTRAST  6/25/2025 12:54 pm   INDICATION: Signs/Symptoms:confusion post heart catheterization Signs/Symptoms:confusion post heart catheterization.  Large template 3   COMPARISON: None.   ACCESSION NUMBER(S): [id number]   ORDERING CLINICIAN: DEANDRE ROBLES   TECHNIQUE: Contiguous axial CT images of the brain were obtained without IV contrast.   FINDINGS: Slightly motion degraded exam.   The ventricles, cisterns and sulci are prominent, consistent with mild diffuse volume loss. Areas of white matter low attenuation are nonspecific but likely related to chronic microvascular disease. There is intracranial atherosclerosis.   Gray-white differentiation is preserved. No acute intracranial hemorrhage or mass effect. No midline shift. Patent basal cisterns. No extraaxial fluid collections.   The calvaria is intact. The visualized paranasal sinuses and mastoid air cells are clear.       No acute intracranial pathology identified. Slightly motion degraded exam..     MACRO: Shimon Bliss discussed the significance and urgency of this critical finding by EPIC secure chat with  Val Ridley on 6/25/2025 at 1:04 pm.  (**-RCF-**) Findings:  See findings.   Signed by: Shimon Bliss 6/25/2025 1:05 PM Dictation workstation:   [id number]    No echocardiogram results found for the past 14 days        BNP   Date/Time Value Ref Range Status   09/04/2021 06:51  (H) 0 - 99 pg/mL Final     Comment:     .  <100 pg/mL - Heart failure unlikely  100-299 pg/mL -  Intermediate probability of acute heart  .               failure exacerbation. Correlate with clinical  .               context and patient history.    >=300 pg/mL - Heart Failure likely. Correlate with clinical  .               context and patient history.  BNP testing is performed using different testing   methodology at [facility] than at other   [facility] hospitals. Direct result comparisons should   only be made within the same method.          I have personally reviewed the following imaging results:   Imaging  CT brain attack head wo IV contrast  Result Date: 6/25/2025  No acute intracranial pathology identified. Slightly motion degraded exam..     MACRO: Shimon Bliss discussed the significance and urgency of this critical finding by EPIC secure chat with  Val Ridley on 6/25/2025 at 1:04 pm.  (**-RCF-**) Findings:  See findings.   Signed by: Shimon Bliss 6/25/2025 1:05 PM Dictation workstation:   [id number]      Cardiology, Vascular, and Other Imaging  ECG 12 Lead  Result Date: 6/25/2025  Atrial fibrillation with slow ventricular response Incomplete right bundle branch block Abnormal ECG When compared with ECG of 09-JAN-2025 09:21, Criteria for Septal infarct are no longer Present Nonspecific T wave abnormality has replaced inverted T waves in Anterior leads QT has shortened      CT Head (I personally reviewed the images/tracings with the following interpretation)  No acute changes    Stroke Alert CT/MRI review: reviewed on 6/25 at 13:30    IV Thrombolysis IV Thrombolysis Checklist      IV Thrombolysis Given: No; Thrombolysis contraindication reason: Neurologic signs have spontaneously resolved        Assessment/Plan   Assessment & Plan  Stroke-like symptoms    CAD S/P percutaneous coronary angioplasty    Benign essential hypertension    Dyslipidemia    Myasthenia gravis    This is an 79 year old male presenting with sudden onset mental status changes characterized by difficulty talking and  "not following commands.  Code BAT was called.  By the time I arrived, his symptoms have resolved.  On exam, he is A&O X 3.  He has no aphasia, neglect, or other focal neurological deficits.    Recommend MRI Brain, MRA head/neck  Echocardiogram  Labs: lipid panel, hemoglobin A1C  Okay to continue dAPT  Neuro checks  Cardiac Telemetry       I spent 60 minutes in the professional and overall care of this patient.    Case discussed with Dr. Khai Patel MD    Addendum:    I received a page from Dr. Alegre at 15:55.    Last known normal was 15:22 per nursing.   Apparently, the patient's heart rate went down into the 30s.  All of a sudden, he became confused and disoriented.  He had difficulty thinking of the right words.  He appeared very uncomfortable.  He looked nauseated.      Objective:  BP (!) 187/94   Pulse 78   Temp 36.7 °C (98.1 °F)   Resp 22   Ht 1.778 m (5' 10\")   Wt 80.3 kg (177 lb 0.5 oz)   SpO2 98%   BMI 25.40 kg/m²     Gen: appears uncomfortable  Neuro:  --HIF: Awake, alert, only oriented to self; naming 0/2 objects, unable to repeat; some mild difficulty following commands (needed reptition); frequent perseveration    --CN:  PERRLA, EOMI, VFF to threat, no visible facial asymmetry  --Motor: Moves all 4 extremities equally; no focal deficits  --Sensory: Intact to light touch,  --Reflex: 2+ symmetric, toes down  --Cerebellum: Had some difficulty following commands  --Gait: Deferred     NIHSS: 8  1a. Level of Consciousness: 0  1b.  Ask Month and age: 2  1c. Blink eyes & squeeze hands:: 2   2.  Base Gaze: 0  3.  Visual Fields:0  4.  Facial Palsy:0  5a.  Left Arm Motor Drift: 0  5b.  Right Arm Motor Drift: 0  6a.  Left Leg Motor Drift: 0   6b.  Right Leg Motor Drift: 0  7.  Limb Ataxia: untestable  8.  Sensory: 0  9.  Best Language: 2  10.  Dysarthria: 0  11.  Extinction and Inattention: 2 (unable to test)    Assessment:   Sudden onset confusion  - recommend STAT CT Head and CTA " head/neck  - consider VEEG monitoring as seizure is on the differential diagnosis    Case discussed with Dr. Alegre and Dr. Khai Patel MD  [facility]  Department of Neurology      Addendum:   I spoke to telestroke.  He may be having a stuttering stroke.  They recommend STAT MRI wake up protocal to assess FLAIR/DWI mismatch  Recheck ACT or APTT  If there is stroke and there is a mismatch, will proceed with TNK    Delvin Patel MD  [facility]  Department of Neurology    Addendum:  Patient unable to tolerate MRI due to agitation  Due to persistent aphasia and concern for stroke, I recommended TNK.  I spoke to the patient's wife and reviewed benefits (including reduced disability from stroke) along with risks (including increased risk of bleeding), we decided together to administer TNK.    TNK administered  Recommend ICU transfer  Post TNK Care    Delvin Patel MD  [facility]  Department of Neurology         [1]   Past Medical History:  Diagnosis Date    CAD (coronary artery disease)     Hyperlipidemia     Hypertension     MI (myocardial infarction) (Multi)     Myasthenia gravis     Nephrolithiasis    [2]   Past Surgical History:  Procedure Laterality Date    CARDIAC CATHETERIZATION N/A 6/25/2025    Procedure: LHC, With LV;  Surgeon: Eduardo Alegre MD;  Location: [facility] Cardiac Cath Lab;  Service: Cardiovascular;  Laterality: N/A;    CARDIAC CATHETERIZATION N/A 6/25/2025    Procedure: PCI KENYA Stent- Coronary;  Surgeon: Eduardo Alegre MD;  Location: [facility] Cardiac Cath Lab;  Service: Cardiovascular;  Laterality: N/A;    CARDIAC CATHETERIZATION N/A 6/25/2025    Procedure: OCT (Optical Coherence Tomography);  Surgeon: Eduardo Alegre MD;  Location: [facility] Cardiac Cath Lab;  Service: Cardiovascular;  Laterality: N/A;    CORONARY ANGIOPLASTY WITH STENT PLACEMENT      2021    OTHER SURGICAL HISTORY  09/14/2020    Cholecystectomy   [3]   Social History  Tobacco Use    Smoking  status: Never    Smokeless tobacco: Never   Vaping Use    Vaping status: Never Used   Substance Use Topics    Alcohol use: Not Currently     Alcohol/week: 1.0 standard drink of alcohol     Types: 1 Cans of beer per week    Drug use: Not Currently   [4]   Medications Prior to Admission   Medication Sig Dispense Refill Last Dose/Taking    amLODIPine (Norvasc) 5 mg tablet Take 1 tablet (5 mg) by mouth once daily. 90 tablet 3 6/25/2025 Morning    azaTHIOprine (Imuran) 50 mg tablet Take 2 tablets (100 mg) by mouth once daily.   6/24/2025    Xarelto 20 mg tablet Take 1 tablet (20 mg) by mouth once daily in the evening. Take with meals. 90 tablet 3 6/23/2025

## 2025-06-25 NOTE — SIGNIFICANT EVENT
"Brain attack  Was called to the Cath Lab to evaluate patient, he is status post RCA stent  He tolerated procedure well, he did receive Versed and fentanyl about an hour prior  After the procedure he was confused, he was not able to make sense of what was going on, he had some difficulty talking  Stat brain CT was done, no acute process  Patient has no focal deficits on my exam, he is moving his arms and legs equal, sensation is intact, no facial droop  He is able to say  \" no ifs, ands or buts\" clearly  His exam is improving once I arrived to the floor  He was able to identify a pen, phone and read the menu  I relayed this to the tele stroke neurologist, since patient's symptoms are improving, she will not receive TNK at this time  Neurology was consulted  Patient will be admitted overnight for observation      1600pm  Another brain attack was called this afternoon, patient is still having some confusion at times.  It is intermittent.  Stat CT brain and CTA of the head and neck is ordered. Neurology and cardiology are at bedside.      "

## 2025-06-26 ENCOUNTER — APPOINTMENT (OUTPATIENT)
Dept: RADIOLOGY | Facility: HOSPITAL | Age: 80
End: 2025-06-26
Payer: MEDICARE

## 2025-06-26 ENCOUNTER — APPOINTMENT (OUTPATIENT)
Dept: CARDIOLOGY | Facility: HOSPITAL | Age: 80
End: 2025-06-26
Payer: MEDICARE

## 2025-06-26 LAB
ALBUMIN SERPL BCP-MCNC: 3.9 G/DL (ref 3.4–5)
ALP SERPL-CCNC: 64 U/L (ref 33–136)
ALT SERPL W P-5'-P-CCNC: 11 U/L (ref 10–52)
AMMONIA PLAS-SCNC: 27 UMOL/L (ref 16–53)
ANION GAP BLDA CALCULATED.4IONS-SCNC: 10 MMO/L (ref 10–25)
ANION GAP SERPL CALC-SCNC: 15 MMOL/L (ref 10–20)
AORTIC VALVE PEAK VELOCITY: 1.27 M/S
APPEARANCE UR: CLEAR
ARTERIAL PATENCY WRIST A: ABNORMAL
AST SERPL W P-5'-P-CCNC: 36 U/L (ref 9–39)
AV PEAK GRADIENT: 7 MMHG
AVA (PEAK VEL): 3.32 CM2
BASE EXCESS BLDA CALC-SCNC: -0.2 MMOL/L (ref -2–3)
BASOPHILS # BLD AUTO: 0.02 X10*3/UL (ref 0–0.1)
BASOPHILS NFR BLD AUTO: 0.2 %
BILIRUB SERPL-MCNC: 1.5 MG/DL (ref 0–1.2)
BILIRUB UR STRIP.AUTO-MCNC: NEGATIVE MG/DL
BODY TEMPERATURE: ABNORMAL
BUN SERPL-MCNC: 18 MG/DL (ref 6–23)
CA-I BLDA-SCNC: 1.22 MMOL/L (ref 1.1–1.33)
CALCIUM SERPL-MCNC: 9.3 MG/DL (ref 8.6–10.3)
CHLORIDE BLDA-SCNC: 103 MMOL/L (ref 98–107)
CHLORIDE SERPL-SCNC: 101 MMOL/L (ref 98–107)
CHOLEST SERPL-MCNC: 211 MG/DL (ref 0–199)
CHOLESTEROL/HDL RATIO: 4.7
CO2 SERPL-SCNC: 21 MMOL/L (ref 21–32)
COLOR UR: YELLOW
CREAT SERPL-MCNC: 0.93 MG/DL (ref 0.5–1.3)
EGFRCR SERPLBLD CKD-EPI 2021: 83 ML/MIN/1.73M*2
EJECTION FRACTION APICAL 4 CHAMBER: 57
EJECTION FRACTION: 58 %
EOSINOPHIL # BLD AUTO: 0 X10*3/UL (ref 0–0.4)
EOSINOPHIL NFR BLD AUTO: 0 %
ERYTHROCYTE [DISTWIDTH] IN BLOOD BY AUTOMATED COUNT: 14.6 % (ref 11.5–14.5)
EST. AVERAGE GLUCOSE BLD GHB EST-MCNC: 100 MG/DL
GLUCOSE BLD MANUAL STRIP-MCNC: 107 MG/DL (ref 74–99)
GLUCOSE BLD MANUAL STRIP-MCNC: 111 MG/DL (ref 74–99)
GLUCOSE BLD MANUAL STRIP-MCNC: 140 MG/DL (ref 74–99)
GLUCOSE BLD MANUAL STRIP-MCNC: 143 MG/DL (ref 74–99)
GLUCOSE BLD MANUAL STRIP-MCNC: 147 MG/DL (ref 74–99)
GLUCOSE BLD MANUAL STRIP-MCNC: 74 MG/DL (ref 74–99)
GLUCOSE BLDA-MCNC: 148 MG/DL (ref 74–99)
GLUCOSE SERPL-MCNC: 140 MG/DL (ref 74–99)
GLUCOSE UR STRIP.AUTO-MCNC: NORMAL MG/DL
HBA1C MFR BLD: 5.1 % (ref ?–5.7)
HCO3 BLDA-SCNC: 21.2 MMOL/L (ref 22–26)
HCT VFR BLD AUTO: 40.5 % (ref 41–52)
HCT VFR BLD EST: 42 % (ref 41–52)
HDLC SERPL-MCNC: 44.8 MG/DL
HGB BLD-MCNC: 13.7 G/DL (ref 13.5–17.5)
HGB BLDA-MCNC: 13.9 G/DL (ref 13.5–17.5)
IMM GRANULOCYTES # BLD AUTO: 0.04 X10*3/UL (ref 0–0.5)
IMM GRANULOCYTES NFR BLD AUTO: 0.4 % (ref 0–0.9)
INHALED O2 CONCENTRATION: 21 %
KETONES UR STRIP.AUTO-MCNC: ABNORMAL MG/DL
LACTATE BLDA-SCNC: 1.8 MMOL/L (ref 0.4–2)
LDLC SERPL CALC-MCNC: 151 MG/DL
LEFT VENTRICLE INTERNAL DIMENSION DIASTOLE: 4.61 CM (ref 3.5–6)
LEFT VENTRICULAR OUTFLOW TRACT DIAMETER: 2.06 CM
LEUKOCYTE ESTERASE UR QL STRIP.AUTO: NEGATIVE
LV EJECTION FRACTION BIPLANE: 53 %
LYMPHOCYTES # BLD AUTO: 0.36 X10*3/UL (ref 0.8–3)
LYMPHOCYTES NFR BLD AUTO: 3.5 %
MAGNESIUM SERPL-MCNC: 1.9 MG/DL (ref 1.6–2.4)
MCH RBC QN AUTO: 30.8 PG (ref 26–34)
MCHC RBC AUTO-ENTMCNC: 33.8 G/DL (ref 32–36)
MCV RBC AUTO: 91 FL (ref 80–100)
MONOCYTES # BLD AUTO: 0.94 X10*3/UL (ref 0.05–0.8)
MONOCYTES NFR BLD AUTO: 9.2 %
NEUTROPHILS # BLD AUTO: 8.89 X10*3/UL (ref 1.6–5.5)
NEUTROPHILS NFR BLD AUTO: 86.7 %
NITRITE UR QL STRIP.AUTO: NEGATIVE
NON HDL CHOLESTEROL: 166 MG/DL (ref 0–149)
NRBC BLD-RTO: 0 /100 WBCS (ref 0–0)
OXYHGB MFR BLDA: 96.4 % (ref 94–98)
PCO2 BLDA: 26 MM HG (ref 38–42)
PH BLDA: 7.52 PH (ref 7.38–7.42)
PH UR STRIP.AUTO: 7 [PH]
PHOSPHATE SERPL-MCNC: 3.5 MG/DL (ref 2.5–4.9)
PLATELET # BLD AUTO: 213 X10*3/UL (ref 150–450)
PO2 BLDA: 88 MM HG (ref 85–95)
POTASSIUM BLDA-SCNC: 4.4 MMOL/L (ref 3.5–5.3)
POTASSIUM SERPL-SCNC: 4.1 MMOL/L (ref 3.5–5.3)
PROT SERPL-MCNC: 7.6 G/DL (ref 6.4–8.2)
PROT UR STRIP.AUTO-MCNC: ABNORMAL MG/DL
RBC # BLD AUTO: 4.45 X10*6/UL (ref 4.5–5.9)
RBC # UR STRIP.AUTO: ABNORMAL MG/DL
RBC #/AREA URNS AUTO: NORMAL /HPF
RIGHT VENTRICLE FREE WALL PEAK S': 13 CM/S
RIGHT VENTRICLE PEAK SYSTOLIC PRESSURE: 21 MMHG
SAO2 % BLDA: 99 % (ref 94–100)
SITE OF ARTERIAL PUNCTURE: ABNORMAL
SODIUM BLDA-SCNC: 130 MMOL/L (ref 136–145)
SODIUM SERPL-SCNC: 133 MMOL/L (ref 136–145)
SP GR UR STRIP.AUTO: >1.05
TEST COMMENT: ABNORMAL
TRICUSPID ANNULAR PLANE SYSTOLIC EXCURSION: 1.5 CM
TRIGL SERPL-MCNC: 77 MG/DL (ref 0–149)
UROBILINOGEN UR STRIP.AUTO-MCNC: ABNORMAL MG/DL
VLDL: 15 MG/DL (ref 0–40)
WBC # BLD AUTO: 10.3 X10*3/UL (ref 4.4–11.3)
WBC #/AREA URNS AUTO: NORMAL /HPF

## 2025-06-26 PROCEDURE — 81001 URINALYSIS AUTO W/SCOPE: CPT

## 2025-06-26 PROCEDURE — 82140 ASSAY OF AMMONIA: CPT

## 2025-06-26 PROCEDURE — 36415 COLL VENOUS BLD VENIPUNCTURE: CPT

## 2025-06-26 PROCEDURE — 70551 MRI BRAIN STEM W/O DYE: CPT | Performed by: RADIOLOGY

## 2025-06-26 PROCEDURE — 70551 MRI BRAIN STEM W/O DYE: CPT

## 2025-06-26 PROCEDURE — 84132 ASSAY OF SERUM POTASSIUM: CPT

## 2025-06-26 PROCEDURE — 99233 SBSQ HOSP IP/OBS HIGH 50: CPT | Performed by: STUDENT IN AN ORGANIZED HEALTH CARE EDUCATION/TRAINING PROGRAM

## 2025-06-26 PROCEDURE — 2500000001 HC RX 250 WO HCPCS SELF ADMINISTERED DRUGS (ALT 637 FOR MEDICARE OP): Performed by: INTERNAL MEDICINE

## 2025-06-26 PROCEDURE — 93306 TTE W/DOPPLER COMPLETE: CPT | Performed by: INTERNAL MEDICINE

## 2025-06-26 PROCEDURE — 97162 PT EVAL MOD COMPLEX 30 MIN: CPT | Mod: GP

## 2025-06-26 PROCEDURE — 2500000004 HC RX 250 GENERAL PHARMACY W/ HCPCS (ALT 636 FOR OP/ED)

## 2025-06-26 PROCEDURE — 2500000001 HC RX 250 WO HCPCS SELF ADMINISTERED DRUGS (ALT 637 FOR MEDICARE OP)

## 2025-06-26 PROCEDURE — 99291 CRITICAL CARE FIRST HOUR: CPT | Performed by: INTERNAL MEDICINE

## 2025-06-26 PROCEDURE — 70450 CT HEAD/BRAIN W/O DYE: CPT

## 2025-06-26 PROCEDURE — 93306 TTE W/DOPPLER COMPLETE: CPT

## 2025-06-26 PROCEDURE — 85025 COMPLETE CBC W/AUTO DIFF WBC: CPT

## 2025-06-26 PROCEDURE — 2500000005 HC RX 250 GENERAL PHARMACY W/O HCPCS

## 2025-06-26 PROCEDURE — 99233 SBSQ HOSP IP/OBS HIGH 50: CPT | Performed by: PSYCHIATRY & NEUROLOGY

## 2025-06-26 PROCEDURE — 92523 SPEECH SOUND LANG COMPREHEN: CPT | Mod: GN | Performed by: SPEECH-LANGUAGE PATHOLOGIST

## 2025-06-26 PROCEDURE — 82947 ASSAY GLUCOSE BLOOD QUANT: CPT

## 2025-06-26 PROCEDURE — 97165 OT EVAL LOW COMPLEX 30 MIN: CPT | Mod: GO

## 2025-06-26 PROCEDURE — 84100 ASSAY OF PHOSPHORUS: CPT

## 2025-06-26 PROCEDURE — 80061 LIPID PANEL: CPT

## 2025-06-26 PROCEDURE — 2060000001 HC INTERMEDIATE ICU ROOM DAILY

## 2025-06-26 PROCEDURE — 36600 WITHDRAWAL OF ARTERIAL BLOOD: CPT

## 2025-06-26 PROCEDURE — 83735 ASSAY OF MAGNESIUM: CPT

## 2025-06-26 PROCEDURE — 83036 HEMOGLOBIN GLYCOSYLATED A1C: CPT | Mod: STJLAB

## 2025-06-26 RX ORDER — DEXTROSE MONOHYDRATE 50 G/100ML
INJECTION, SOLUTION INTRAVENOUS AS NEEDED
Status: DISCONTINUED | OUTPATIENT
Start: 2025-06-25 | End: 2025-06-26 | Stop reason: HOSPADM

## 2025-06-26 RX ORDER — ATORVASTATIN CALCIUM 80 MG/1
80 TABLET, FILM COATED ORAL NIGHTLY
Status: DISCONTINUED | OUTPATIENT
Start: 2025-06-26 | End: 2025-06-27 | Stop reason: HOSPADM

## 2025-06-26 RX ORDER — HEPARIN SODIUM 5000 [USP'U]/ML
5000 INJECTION, SOLUTION INTRAVENOUS; SUBCUTANEOUS EVERY 8 HOURS
Status: DISCONTINUED | OUTPATIENT
Start: 2025-06-26 | End: 2025-06-27

## 2025-06-26 RX ORDER — ATORVASTATIN CALCIUM 80 MG/1
80 TABLET, FILM COATED ORAL NIGHTLY
Status: DISCONTINUED | OUTPATIENT
Start: 2025-06-26 | End: 2025-06-26

## 2025-06-26 RX ORDER — HEPARIN SODIUM 5000 [USP'U]/ML
5000 INJECTION, SOLUTION INTRAVENOUS; SUBCUTANEOUS EVERY 8 HOURS
Status: DISCONTINUED | OUTPATIENT
Start: 2025-06-26 | End: 2025-06-26

## 2025-06-26 RX ORDER — NAPROXEN SODIUM 220 MG/1
81 TABLET, FILM COATED ORAL DAILY
Status: DISCONTINUED | OUTPATIENT
Start: 2025-06-26 | End: 2025-06-27 | Stop reason: HOSPADM

## 2025-06-26 RX ADMIN — AMLODIPINE BESYLATE 5 MG: 5 TABLET ORAL at 10:00

## 2025-06-26 RX ADMIN — HEPARIN SODIUM 5000 UNITS: 5000 INJECTION, SOLUTION INTRAVENOUS; SUBCUTANEOUS at 18:52

## 2025-06-26 RX ADMIN — POLYETHYLENE GLYCOL 3350 17 G: 17 POWDER, FOR SOLUTION ORAL at 10:00

## 2025-06-26 RX ADMIN — ATORVASTATIN CALCIUM 80 MG: 80 TABLET, FILM COATED ORAL at 23:41

## 2025-06-26 RX ADMIN — ASPIRIN 81 MG CHEWABLE TABLET 81 MG: 81 TABLET CHEWABLE at 18:50

## 2025-06-26 RX ADMIN — AZATHIOPRINE 100 MG: 50 TABLET ORAL at 10:00

## 2025-06-26 ASSESSMENT — PAIN SCALES - GENERAL
PAINLEVEL_OUTOF10: 0 - NO PAIN

## 2025-06-26 ASSESSMENT — COGNITIVE AND FUNCTIONAL STATUS - GENERAL
DRESSING REGULAR UPPER BODY CLOTHING: A LITTLE
DRESSING REGULAR LOWER BODY CLOTHING: A LITTLE
TOILETING: A LOT
MOBILITY SCORE: 20
DRESSING REGULAR LOWER BODY CLOTHING: A LOT
MOVING TO AND FROM BED TO CHAIR: A LITTLE
WALKING IN HOSPITAL ROOM: A LITTLE
STANDING UP FROM CHAIR USING ARMS: A LITTLE
CLIMB 3 TO 5 STEPS WITH RAILING: A LOT
PERSONAL GROOMING: A LITTLE
HELP NEEDED FOR BATHING: A LOT
DAILY ACTIVITIY SCORE: 15
DRESSING REGULAR UPPER BODY CLOTHING: A LITTLE
MOVING TO AND FROM BED TO CHAIR: A LITTLE
DAILY ACTIVITIY SCORE: 19
STANDING UP FROM CHAIR USING ARMS: A LITTLE
MOBILITY SCORE: 17
PERSONAL GROOMING: A LITTLE
MOVING FROM LYING ON BACK TO SITTING ON SIDE OF FLAT BED WITH BEDRAILS: A LITTLE
TURNING FROM BACK TO SIDE WHILE IN FLAT BAD: A LITTLE
WALKING IN HOSPITAL ROOM: A LITTLE
CLIMB 3 TO 5 STEPS WITH RAILING: A LITTLE
EATING MEALS: A LITTLE
HELP NEEDED FOR BATHING: A LITTLE
TOILETING: A LITTLE

## 2025-06-26 ASSESSMENT — ACTIVITIES OF DAILY LIVING (ADL)
ADL_ASSISTANCE: INDEPENDENT
BATHING_ASSISTANCE: MODERATE

## 2025-06-26 ASSESSMENT — PAIN - FUNCTIONAL ASSESSMENT
PAIN_FUNCTIONAL_ASSESSMENT: 0-10

## 2025-06-26 NOTE — PROGRESS NOTES
Nutrition Diet Education  Dietitian discretion, Diet education   Nutrition Note:  David Caldwell is a 79 y.o. male presenting 6/25 for elective heart cath for USA s/p PCI of the RCA with synergy and KENYA. Post op pt with confusion and difficulties speaking with brain attack called x2; pt transitioned to ICU status after 2nd call and given TNK. Neurology involved in pt care.     Past Medical History   has a past medical history of CAD (coronary artery disease), Hyperlipidemia, Hypertension, MI (myocardial infarction) (Multi), Myasthenia gravis, and Nephrolithiasis.  Surgical History   has a past surgical history that includes Other surgical history (09/14/2020); Coronary angioplasty with stent; Cardiac catheterization (N/A, 6/25/2025); Cardiac catheterization (N/A, 6/25/2025); and Cardiac catheterization (N/A, 6/25/2025).     DIET: Cardiac 2-3g Na--passed ST for speech and swallow. NKFA or intolerances per pt.   LABS: T chol 211, triglyceride 77, HDL 44.8, , HA1C 5.1%  177.8cm, 72.6kg BMI 22.9kg/m2     Education Documentation  Nutrition Related Education, taught by Radha Machado RD at 6/26/2025  4:22 PM.  Learner: Family, Patient  Readiness: Acceptance  Method: Explanation  Response: Verbalizes Understanding      6/26: Met with pt and family at bedside. Rationale for cardiac 2-3g Na diet discussed--aware no more than 2000mg sodium per day with limits of sat fat and trans fat; AYR heart healthy items discussed. Pt and family have [facility] Book at bedside; deny need for further written info. Pt has been to cardiac rehab in the past. Refer to cardiac rehab RD upon discharge.     Follow Up:     Time Spent (min): 30 minutes  Follow up: Provided information on outpatient nutrition therapy services (via cardiac rehab.)  Last Date of Nutrition Visit: 06/26/25  Nutrition Follow-Up Needed?: 7-10 days  Follow up Comment: alina chin done

## 2025-06-26 NOTE — NURSING NOTE
Due to emergent nature of patient's stroke like symptoms and TNK administration this afternoon post cath,  TR band post cath removal was unable to be reviewed appropriately. Orders for air removal were not in the order set. TR band removed at this time as it had been on since patient arrival to SDU at 1322. Patient's right extremity had been monitored frequently, including neurovascular assessments, and has no complications or signs of ischemia. Pulses are normal, no hematoma present. Pressure dressing applied to the site after TR band removal. ICU and SDU management teams made aware of this situation.

## 2025-06-26 NOTE — SIGNIFICANT EVENT
1522- This RN called to bedside by patient's son who states his father is suddenly not feeling well, nauseated, and starting to become confused again.  Full NIH assessment completed at this time.. Patient is rapidly worsening compared to his improved neurological assessment earlier. He is now unable to follow commands, nauseated, complaining of a headache, and having word finding difficulties. No weakness noted in the extremities, pupils are PERRLA 2 and brisk.     1522 BAT called again at this time to to patient's acute neurological change. Follow BAT narrator from rapid note for rest of details from BAT. Patient taken to STAT CT brain, Dr. Limon in CT department reviewing images, no hemorrhage noted. Patient taken back to SDU.     1630- Neuro team recommending post CT wake up stroke protocol and STAT MRI of the brain.     1730- Patient becoming increasingly restless and requiring multiple IV push medications in an attempt to sedate to achieve adequate MRI imaging.       1750- Additional meds brought to MRI as patient is still restless and unable to lay still for MRI,     1753- Group message started between Neuro, ICU, and Dr. Stanley informing of the unsuccessful MRI. Decision made from the interdisciplinary team to administer TNK STAT once BP is controlled.     1805- Patient given 10mg IV labetalol, BP improved as charted in vitals flowsheet at 1812. Patient moved to ICU    1815- TNK administered at this time. Reported off to Jim ICU charge RN.

## 2025-06-26 NOTE — PROGRESS NOTES
INTERVENTIONAL CARDIOLOGY PROGRESS NOTE    Subjective Data:  Patient reports he is feeling near back to his baseline, just fatigued due to all the testing today       Objective Data:  Last Recorded Vitals:  Vitals:    06/26/25 1500 06/26/25 1600 06/26/25 1700 06/26/25 1800   BP: 139/67 126/64 141/74 140/78   BP Location:       Patient Position:       Pulse: 92 76 74 76   Resp:  20     Temp:  36.2 °C (97.2 °F)     TempSrc:  Temporal     SpO2: 97% 98% 94% 98%   Weight:       Height:           Last Labs:  CBC - 6/26/2025:  3:41 AM  10.3 13.7 213    40.5      CMP - 6/26/2025:  3:41 AM  9.3 7.6 36 --- 1.5   3.5 3.9 11 64      PTT - 6/25/2025:  2:34 PM  1.2   13.1 34     BNP   Date/Time Value Ref Range Status   09/04/2021 06:51  0 - 99 pg/mL Final     Comment:     .  <100 pg/mL - Heart failure unlikely  100-299 pg/mL - Intermediate probability of acute heart  .               failure exacerbation. Correlate with clinical  .               context and patient history.    >=300 pg/mL - Heart Failure likely. Correlate with clinical  .               context and patient history.  BNP testing is performed using different testing   methodology at [facility] than at other   [facility]. Direct result comparisons should   only be made within the same method.       HGBA1C   Date/Time Value Ref Range Status   06/26/2025 03:41 AM 5.1 See comment % Final   12/18/2023 10:31 AM 5.3 see below % Final     LDLCALC   Date/Time Value Ref Range Status   06/26/2025 03:41  <=99 mg/dL Final     Comment:                                 Near   Borderline      AGE      Desirable  Optimal    High     High     Very High     0-19 Y     0 - 109     ---    110-129   >/= 130     ----    20-24 Y     0 - 119     ---    120-159   >/= 160     ----      >24 Y     0 -  99   100-129  130-159   160-189     >/=190     12/20/2024 11:45  <=99 mg/dL Final     Comment:                                 Near   Borderline      AGE       Desirable  Optimal    High     High     Very High     0-19 Y     0 - 109     ---    110-129   >/= 130     ----    20-24 Y     0 - 119     ---    120-159   >/= 160     ----      >24 Y     0 -  99   100-129  130-159   160-189     >/=190     12/18/2023 10:31  <=99 mg/dL Final     Comment:                                 Near   Borderline      AGE      Desirable  Optimal    High     High     Very High     0-19 Y     0 - 109     ---    110-129   >/= 130     ----    20-24 Y     0 - 119     ---    120-159   >/= 160     ----      >24 Y     0 -  99   100-129  130-159   160-189     >/=190       VLDL   Date/Time Value Ref Range Status   06/26/2025 03:41 AM 15 0 - 40 mg/dL Final   12/20/2024 11:45 AM 15 0 - 40 mg/dL Final   12/18/2023 10:31 AM 15 0 - 40 mg/dL Final        Physical Exam:  General: NAD, well-appearing  HEENT: moist mucous membranes, no jaundice  Neck: No JVD, no carotid bruit  Lungs: CTA vicki, no wheezing or rales  Cardiac: RRR, no murmurs  Extremities: 2+ radial pulses, no edema, no wounds  Skin: warm, dry       Assessment/Plan   #CAD status post PCI for anginal symptoms  # Permanent atrial fibrillation  # Acute stroke  # Dyslipidemia      -Status post OCT guided PCI to RCA.  Once okay from neurology standpoint please restart aspirin 81 mg daily and clopidogrel 75 mg daily.  Per neurology holding off on antiplatelet agents for 24 hours following TNK administration  -Follow-up neurology recommendations for acute CVA management post TNK  -When deemed safe we will restart Xarelto for atrial fibrillation  - Continue high intensity statin  - Blood pressure is at goal    Tom Dotson MD

## 2025-06-26 NOTE — PROGRESS NOTES
Critical Care Daily Progress        Subjective   Patient is a 79 y.o. male admitted on 6/25/2025  9:29 AM with the following indication(s) for ICU care post TNK monitoring..     Overnight Events: Patient agitated, restless, not orientable overnight.  Patient placed on Precedex temporarily for sedation.  Cardene weaned off.  Patient had temporary episode of bradycardia/hypotension, that resolved.  Patient ANO x 3 this morning, with intact neuroexam.  NIH of 0.    Complaints: has none..     MEDICATIONS:  Scheduled: PRN: Continuous:   Scheduled Medications[1] PRN Medications[2] Continuous Medications[3]     Objective   Vitals:  Temp  Min: 36.6 °C (97.9 °F)  Max: 36.8 °C (98.2 °F)  Pulse  Min: 50  Max: 100  BP  Min: 98/53  Max: 214/130  Resp  Min: 14  Max: 49  SpO2  Min: 87 %  Max: 100 %    Physical Exam:   Physical Exam   Physical Exam  Constitutional:       Appearance: He is normal weight.   HENT:      Head: Normocephalic and atraumatic.      Nose: Nose normal.      Mouth/Throat:      Mouth: Mucous membranes are moist.      Pharynx: Oropharynx is clear.   Eyes:      Extraocular Movements: Extraocular movements intact.      Conjunctiva/sclera: Conjunctivae normal.      Pupils: Pupils are equal, round, and reactive to light.   Cardiovascular:      Rate and Rhythm: Normal rate and regular rhythm.      Pulses: Normal pulses.      Heart sounds: Normal heart sounds.   Pulmonary:      Effort: Pulmonary effort is normal.      Breath sounds: Normal breath sounds.   Abdominal:      General: Abdomen is flat. Bowel sounds are normal.      Palpations: Abdomen is soft.   Musculoskeletal:         General: Normal range of motion.      Cervical back: Normal range of motion and neck supple.   Skin:     General: Skin is warm and dry.      Capillary Refill: Capillary refill takes less than 2 seconds.   Neurological:      General: No focal deficit present.      Mental Status: He is alert and oriented to person, place, and time. Mental  status is at baseline.   Psychiatric:         Mood and Affect: Mood normal.         Behavior: Behavior normal.                Assessment/Plan   Overall Assessment:  Patient is a 79 y.o. male with a history of myasthenia gravis, permanent atrial fibrillation, CAD (PCI of RCA 2021 in the setting of NSTEMI, LAD noted to be occluded), dyslipidemia, CVA (thalamic stroke), and hypertension admitted to ICU for post TNK monitoring.    Neuro: #CVA (primarily aphasia)  - History: History of thalamic stroke  - Home meds: N/A  - Interventions: TNK administered at 1815.  - CAM ICU  - Every hour neurochecks  - Initial CT head and CTA obtained earlier this morning prior to medicine admission showed no acute abnormality, no LVO, no acute bleed.  MRI was attempted after second brain attack was called on patient, however cannot be completed.  - MRI scan scheduled for 24 hours post TNK administration for stability evaluation.  - NIH of 3 for dysarthria, aphasia, not oriented prior to TNK administration.  - On exam this morning, patient following commands, ANO x 3, not agitated.     Cardiovascular: #A-fib on Xarelto, CAD (PCI of RCA 2021 in the setting of NSTEMI, LAD noted to be occluded), HTN  - Goals:  [MAP> 65, HR ]  - Home meds: Amlodipine, Xarelto  - Last echo: 9/5/2021: LVEF of 55 to 60%, mild diastolic dysfunction  - Interventions: Cardene ordered as needed for SBP less than 180.  Cardene weaned off overnight.  - Holding antiplatelets, anticoagulants after TNK administration.  - New echo pending for this admission.       Pulmonary: CHINO      Continuous pulse oximetry   O2 PRN to maintain SpO2 > 94%, wean as tolerated  - Imaging: N/A       Gastrointestinal: CHINO  Results from last 7 days   Lab Units 06/26/25  0341 06/25/25  1434 06/25/25  1401   INR   --  1.2*  --    APTT seconds  --  34  --    ALK PHOS U/L 64  --  61   AST U/L 36  --  22   ALT U/L 11  --  8*       - Last BM:  N/A  - Additional interventions: N/A    Renal:  CHINO  Results from last 7 days   Lab Units 25  03425  1401 25  0947   SODIUM mmol/L 133* 134* 138   POTASSIUM mmol/L 4.1 4.3 4.2   CHLORIDE mmol/L 101 105 105   MAGNESIUM mg/dL 1.90 2.20  --    PHOSPHORUS mg/dL 3.5  --   --    CO2 mmol/L 21 21 25   BUN mg/dL 18 14 15   CREATININE mg/dL 0.93 0.88 0.95   CALCIUM mg/dL 9.3 9.0 9.3       Net IO Since Admission: -611.77 mL [25 0728]  - Daily CMP,Mg,Phos  - Strict I/O   - Electrolytes: Replete per protocol, goal K>4 Phos >3 Mg >2       Endocrine: CHINO  Results from last 7 days   Lab Units 25  0400 25  03425  0123 25  2024 25  1530 25  1401 25  1232   POCT GLUCOSE mg/dL 140*  --  147* 165* 95  --  74   GLUCOSE mg/dL  --  140*  --   --   --  140*  --    TRIGLYCERIDES mg/dL  --  77  --   --   --   --   --       -BG < 180 goal    Hematology: CHINO  Results from last 7 days   Lab Units 25  1401 25  0947   HEMOGLOBIN g/dL 13.7 13.8 13.7   HEMATOCRIT % 40.5* 44.2 41.3   MCV fL 91 97 93   MCHC g/dL 33.8 31.2* 33.2   PLATELETS AUTO x10*3/uL 213 180 202     - Daily CBC  - DVT Prophylaxis: SCDs, holding chemical anticoagulants due to recent TNK administration    Infectious Disease: CHINO  Results from last 7 days   Lab Units 25  03425  14025  0947   WBC AUTO x10*3/uL 10.3 6.4 5.7     Temp (24hrs), Av.7 °C (98.1 °F), Min:36.6 °C (97.9 °F), Max:36.8 °C (98.2 °F)     No indication for IV antibiotics at this time. Patient afebrile, nontachycardic.     Musculoskeletal/integumentary: CHINO  - PT to see   - OT to see     LDA:  External Urinary Catheter Male (Active)   Placement Date/Time: 25   Placed by: Mervat Hyde RN  Hand Hygiene Completed: Yes  External Catheter Type: Male   Number of days: 0         CODE STATUS: Full Code    Disposition: Patient to remain in PCU, for 24-hour MRI stability scan.    RESTRAINTS: type: None    ABCDEF Checklist  Analgesia:  Spontaneous awakening trial to be pursued if clinically appropriate. RASS goal reviewed  Breathing: Spontaneous breathing trial to be pursued if clinically appropriate. Mechanical power of assisted ventilation reviewed  Choice of analgesia/sedation: Analgesic and sedative agents adjusted per clinical context.   Delirium assessed by CAM, will avoid exacerbating factors  Early mobility and exercise: Physical and occupational therapy engaged  Family: Plan of care, overall trajectory of patient shared with family. Questions elicited and answered as appropriate.     Due to the high probability of life threatening clinical decompensation, the patient required critical care time evaluating and managing this patient.  Critical care time included obtaining a history, examining the patient, ordering and reviewing studies, discussing, developing, and implementing a management plan, evaluating the patient's response to treatment, and discussion with other care team providers. I saw and evaluated the patient myself.  Critical care time was performed exclusive of billable procedures.    Critical care time: 30 minutes.    Case discussed with attending , Dr. Paige.  Gareth Jeong MD    Attending Addendum:    I saw and evaluated the patient. I personally obtained the key and critical portions of the history and physical exam or was physically present for key and critical portions performed by the resident/fellow. I reviewed the resident/fellow's documentation and discussed the patient with the resident/fellow. I agree with the resident/fellow's medical decision making as documented in the note.     Critical care time is 35 minutes.         [1] amLODIPine, 5 mg, oral, Daily  [Held by provider] aspirin, 81 mg, oral, Daily   Or  [Held by provider] aspirin, 81 mg, oral, Daily   Or  [Held by provider] aspirin, 81 mg, nasogastric tube, Daily   Or  [Held by provider] aspirin, 300 mg, rectal, Daily  azaTHIOprine, 100 mg, oral,  Daily  perflutren lipid microspheres, 0.5-10 mL of dilution, intravenous, Once in imaging  perflutren lipid microspheres, 0.5-10 mL of dilution, intravenous, Once in imaging  perflutren protein A microsphere, 0.5 mL, intravenous, Once in imaging  perflutren protein A microsphere, 0.5 mL, intravenous, Once in imaging  polyethylene glycol, 17 g, oral, Daily  sulfur hexafluoride microsphr, 2 mL, intravenous, Once in imaging  sulfur hexafluoride microsphr, 2 mL, intravenous, Once in imaging  [2] PRN medications: dextrose, dextrose, glucagon, glucagon, labetaloL, oxygen, oxygen, polyethylene glycol, prochlorperazine **OR** prochlorperazine **OR** prochlorperazine  [3] dexmedeTOMIDine, 0-1.5 mcg/kg/hr, Last Rate: Stopped (06/26/25 0130)  niCARdipine, 0-15 mg/hr, Last Rate: Stopped (06/25/25 2010)

## 2025-06-26 NOTE — NURSING NOTE
Cardiac Rehab: Patient seen for qualifying diagnosis to cardiac rehab program. Described and discussed Phase II Cardiac Rehab program with patient and family, gave [facility] Handbook and pamphlet, and discussed enrollment into Phase II. Reviewed cardiac lifestyle modifications necessary for improved heart health. Encouraged cardiology follow up post hospitalization. Discussed the importance of medication compliance. Patient has done cardiac rehab in the past. Cardiac rehab staff will contact patient following discharge for enrollment in program.

## 2025-06-26 NOTE — PROGRESS NOTES
Physical Therapy    Physical Therapy Evaluation    Patient Name: David Caldwell  MRN: [id number]  Today's Date: 6/26/2025   Time Calculation  Start Time: 1023  Stop Time: 1044  Time Calculation (min): 21 min  2126/2126-A    Assessment/Plan   PT Assessment: Pt demonstrates impairments listed below.  Pt appears below baseline level of function and based on current level of function, pt would benefit from continued skilled therapy while in the hospital to ensure safety, decrease risk of falls, and regain strength/mobility back to baseline.  Once stable enough for discharge, pt would benefit from high intensity therapy.     PT Assessment Results: Decreased strength, Decreased range of motion, Impaired balance, Decreased mobility, Impaired judgement, Decreased safety awareness  Rehab Prognosis: Good  Barriers to Discharge Home: Physical needs  Physical Needs: Stair navigation to access bed limited by function/safety, Intermittent ADL assistance needed, Intermittent mobility assistance needed, High falls risk due to function or environment  Evaluation/Treatment Tolerance: Patient tolerated treatment well  Medical Staff Made Aware: Yes  End of Session Communication: Bedside nurse  End of Session Patient Position: Up in chair, Alarm on (family and Dr Patel in room)  IP OR SWING BED PT PLAN  Inpatient or Swing Bed: Inpatient  PT Plan  Treatment/Interventions: Bed mobility, Transfer training, Gait training, Stair training, Balance training, Neuromuscular re-education, Strengthening, Range of motion, Therapeutic exercise, Therapeutic activity, Home exercise program  PT Plan: Ongoing PT  PT Frequency: Daily  PT Discharge Recommendations: High intensity level of continued care  Equipment Recommended upon Discharge: Wheeled walker  PT Recommended Transfer Status: Assist x1 (Min A)  PT - OK to Discharge: Yes - To next level of care when cleared by medical team    Subjective     Current Problem:  1. Stroke-like symptoms   Transthoracic Echo Complete    Transthoracic Echo Complete    CANCELED: Transthoracic Echo Complete    CANCELED: Transthoracic Echo Complete      2. Unstable angina (Multi)  Cardiac Catheterization Procedure    Cardiac Catheterization Procedure      3. Mini stroke  Transthoracic Echo Complete    Transthoracic Echo Complete      4. Other cerebrovascular disease  CANCELED: Transthoracic Echo Complete    CANCELED: Transthoracic Echo Complete          Past Medical History:  Problem List[1]    General Visit Information:  Per EMR: pt presented to [facility] cath lab for OCT mediated PCI of the RCA with Dr. Alegre today for unstable ungina. After procedure patient experiencing difficulty speaking and following commands, brain attack. Ct obtained indicating no acute intercranial hemorrhage or mass effect. Tele Neuro stroke team was consulted along with inpatient Neurology consult placed. Patient since has recovered. He denies any chief complaints. Cardiology placed on consult.     TNK given 6/25 @ 1815     On arrival, pt supine in bed.  Pt in no apparent distress and agreeable to therapy.    General  Reason for Referral: impaired mobility, gait training  Referred By: Damaso Paige  Family/Caregiver Present: Yes  Caregiver Feedback: son and granddaughter  Co-Treatment: OT  Co-Treatment Reason: safety  Prior to Session Communication: Bedside nurse  Patient Position Received: Bed, 3 rail up, Alarm on    Home Living/PLOF:  Pt lives in house with wife with 3 REBECA with rail.  Half bath on main level.  Bedroom and full bath on 2nd level with WIS, chair and Gbs.  IND with ADLs.  IND with mobility.  Owns multiple canes and walkers.  Pt is active and works out 2x/wk at baseline.  Pt drives and shops.  Denies any falls.     Precautions:  Precautions  Medical Precautions: Fall precautions    Vital Signs:  Vital Signs  SpO2: 97 % (on RA)  Objective     Pain:  Pain Assessment  Pain Assessment: 0-10  0-10 (Numeric) Pain  Score: 0 - No pain    Cognition:  Cognition  Overall Cognitive Status: Within Functional Limits (A&O but requires repeated questrions and cues; pt also seems cofnused at times)  Orientation Level: Oriented X4  Attention:  (impaired)  Safety/Judgement:  (impaired)  Insight: Moderate  Processing Speed: Delayed    General Assessments:      Activity Tolerance  Endurance: Tolerates 10 - 20 min exercise with multiple rests  Sensation  Sensation Comment: pt denies numbness/tingling but reports charley horses in B calves intermittently    Static Sitting Balance  Static Sitting-Comment/Number of Minutes: good  Dynamic Sitting Balance  Dynamic Sitting-Comments: fair plus  Static Standing Balance  Static Standing-Comment/Number of Minutes: fair  Dynamic Standing Balance  Dynamic Standing-Comments: fair minus    Extremity/Trunk Assessments:  RLE  Strength  R knee ext: 4+/5; pt with difficulty fully extending RLE therefore strength tested in ~30 degrees knee flexion  R DF: 4+/5  LLE  Strength  L knee ext: 4+/5  L DF: 4+/5    Functional Mobility:  Bed mobility  Supine to sit: Min A x2; assist required at trunk and BLE    Transfers  Sit to stand: Min A x2; vCs for hand placement; increased time to gain standing balance   Stand to sit: Min A x2; vCs for hand placement and safety     Ambulation/Stairs  Pt performed standing marches with Min A x2 holding onto FWW; decreased balance noted     Pt ambulated 4 ft with Min A x1 and FWW; pt with decreased balance and step length; pt requires Mod cueing for FWW sequencing and safety      Outcome Measures:  [facility] Basic Mobility  Turning from your back to your side while in a flat bed without using bedrails: A little  Moving from lying on your back to sitting on the side of a flat bed without using bedrails: A little  Moving to and from bed to chair (including a wheelchair): A little  Standing up from a chair using your arms (e.g. wheelchair or bedside chair): A little  To walk in hospital  room: A little  Climbing 3-5 steps with railing: A lot  Basic Mobility - Total Score: 17    Goals:  Encounter Problems       Encounter Problems (Active)       PT Problem       Pt will be able to perform all bed mobility tasks with Mod I.  (Progressing)       Start:  06/26/25    Expected End:  07/10/25            Pt will perform all transfers with IND and proper safety mechanics.   (Progressing)       Start:  06/26/25    Expected End:  07/10/25            Pt will ambulate 250 ft with Mod I using LRAD for improved functional independence.  (Progressing)       Start:  06/26/25    Expected End:  07/10/25            Pt will be able to negotiate 12 steps with 1 HR with Mod I.  (Progressing)       Start:  06/26/25    Expected End:  07/10/25                 Education Documentation  Body Mechanics, taught by Leslee Cunningham PT at 6/26/2025 11:12 AM.  Learner: Family, Patient  Readiness: Acceptance  Method: Explanation  Response: Verbalizes Understanding    Home Exercise Program, taught by Leslee Cunningham PT at 6/26/2025 11:12 AM.  Learner: Family, Patient  Readiness: Acceptance  Method: Explanation  Response: Verbalizes Understanding    Mobility Training, taught by Leslee Cunningham, PT at 6/26/2025 11:12 AM.  Learner: Family, Patient  Readiness: Acceptance  Method: Explanation  Response: Verbalizes Understanding    Education Comments  No comments found.             [1]   Patient Active Problem List  Diagnosis    Abdominal pain, epigastric    Abnormal CT of the abdomen    Other dietary vitamin B12 deficiency anemia    Permanent atrial fibrillation (Multi)    Bilateral plantar wart    BPH (benign prostatic hyperplasia)    CAD S/P percutaneous coronary angioplasty    Calcium nephrolithiasis    Cervicalgia    Fatigue    Gait disturbance    GERD (gastroesophageal reflux disease)    Benign essential hypertension    Dyslipidemia    Low iron    Migraine    Myasthenia gravis    Persistent testicular pain    Tinea pedis, recurrent     Anemia    Deficiency anemia    Cerebral infarction    Cervical spondylosis without myelopathy    Claustrophobia    Closed fracture of scaphoid bone of wrist    Cognitive communication deficit    Cranial nerve disorder    Dark stools    Diplopia    GUAJARDO (dyspnea on exertion)    Long-term use of immunosuppressant medication    Lumbosacral spondylosis without myelopathy    Malaise and fatigue    Memory loss    CATHI (obstructive sleep apnea)    Pain in joint involving pelvic region and thigh    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

## 2025-06-26 NOTE — PROGRESS NOTES
"David Caldwell is a 79 y.o. male on day 1 of admission presenting with Stroke-like symptoms.    Subjective   Reviewed overnight events  Patient was agitated  He has since returned back to baseline  He has no neuro deficits       Objective     Last Recorded Vitals  Blood pressure 148/69, pulse 66, temperature 36.7 °C (98.1 °F), temperature source Temporal, resp. rate 18, height 1.778 m (5' 10\"), weight 72.6 kg (160 lb 0.9 oz), SpO2 99%.    Gen: NAD  Neuro:  --HIF: A&O X 3, repetition and naming intact  --CN:  PERRLA, EOMI, VFF, no visible facial asymmetry, facial sensation intact, no tongue or palatal deviation, SCM intact  --Motor: Moves all 4 extremities equally; no focal deficits  --Sensory: Intact to light touch, intact to pinprick  --Reflex: 2+ symmetric, toes down  --Cerebellum: FTN and HTS intact  --Gait: Deferred       NIHSS     Physical Exam  Neurological Exam  Relevant Results    NIH Stroke Scale  1A. Level of Consciousness: Alert, Keenly Responsive  1B. Ask Month and Age: 1 Question Right  1C. Blink Eyes & Squeeze Hands: Performs Both Tasks  2. Best Gaze: Normal  3. Visual: No Visual Loss  4. Facial Palsy: Normal Symmetrical Movements  5A. Motor - Left Arm: No Drift  5B. Motor - Right Arm: No Drift  6A. Motor - Left Leg: No Drift  6B. Motor - Right Leg: No Drift  7. Limb Ataxia: Absent  8. Sensory Loss: Normal  9. Best Language: No Aphasia  10. Dysarthria: Normal  11. Extinction and Inattention: No Abnormality  NIH Stroke Scale: 1           Scuddy Coma Scale  Best Eye Response: To verbal stimuli  Best Verbal Response: Confused  Best Motor Response: Follows commands  Emilia Coma Scale Score: 13                                  This patient currently has cardiac telemetry ordered; if you would like to modify or discontinue the telemetry order, click here to go to the orders activity to modify/discontinue the order.  Assessment & Plan  Stroke-like symptoms    CAD S/P percutaneous coronary " angioplasty    Benign essential hypertension    Dyslipidemia    Myasthenia gravis    Permanent atrial fibrillation (Multi)     Stroke Like Symptoms s/p TNK  - post TNK care:   No AP or AC agents for 24 hours following TNK administratin   Goal BP < 180/105   Neuro checks per protocal  - recommend MRI brain  - recommend repeat head CT 24 hours after TNK (if MRI Brain is done at that time, head CT does not need to be done)  - control of vascular risk factors:   Goal BP < 180/105 for now   Goal LDL < 70; continue Atorvastatin 80 mg/dya  - if repeat CT scan is okay, then okay to transfer out of ICU            Delvin Patel MD

## 2025-06-26 NOTE — PROGRESS NOTES
06/26/25 1434   Discharge Planning   Living Arrangements Spouse/significant other   Support Systems Spouse/significant other;Family members   Type of Residence Private residence   Home or Post Acute Services None   Expected Discharge Disposition Home   Does the patient need discharge transport arranged? No   Financial Resource Strain   How hard is it for you to pay for the very basics like food, housing, medical care, and heating? Not very   Intensity of Service   Intensity of Service 0-30 min     Met with pt along with several family members. Pt states we can speak freely. Came in for an out patient procedure and developed stroke like sx. Currently in ICU. Has a sitter. Pt lives at home with his wife and reports he is independent. Drives, uses no assistant devices. PCP is Dr. Interiano and prescriptions are filled at [facility] with no barriers noted. Pt plans to return home at time of dc and thinks he would prefer out patient PT/OT. Will make a final decision HHC vs outpatient closer to dc.

## 2025-06-26 NOTE — CARE PLAN
The clinical goals for the shift include Pt will improve or maintain current neurologic status this shift.    Pt was removed from restraints. Started out shift with neuro status of A/O x2 (Confused to year/situation). MRI completed this shift. 24-hr CT scan to be completed before end of shift. Pt currently has NIH score of 0. A/O x4, no obvious deficits at this time. Earlier in shift pt did require frequent repeating/coaching for commands such as moving feet with therapy and touching finger-to-nose with ataxia scoring for NIH, no longer requires prompting. OOB to chair with therapy x1 assist. Pt passed bedside nursing swallow eval, diet placed by [facility] resident. Pt remains sitting up in bed with family at bedside.       Problem: Pain - Adult  Goal: Verbalizes/displays adequate comfort level or baseline comfort level  Outcome: Met     Problem: Safety - Adult  Goal: Free from fall injury  Outcome: Met     Problem: Discharge Planning  Goal: Discharge to home or other facility with appropriate resources  Outcome: Progressing     Problem: Chronic Conditions and Co-morbidities  Goal: Patient's chronic conditions and co-morbidity symptoms are monitored and maintained or improved  Outcome: Progressing     Problem: Nutrition  Goal: Nutrient intake appropriate for maintaining nutritional needs  Outcome: Met     Problem: General Stroke  Goal: Establish a mutual long term goal with patient by discharge  Outcome: Progressing  Flowsheets (Taken 6/26/2025 1657)  Establish a mutual long term goal with patient by discharge:   Monitor blood pressure   Eat healthy   Attend medical follow-up appointments  Goal: Out of bed three times today  Outcome: Progressing     Problem: ICU Stroke  Goal: Achieve/maintain targeted sodium level throughout shift  Outcome: Progressing     Problem: Skin  Goal: Participates in plan/prevention/treatment measures  Outcome: Progressing  Flowsheets (Taken 6/26/2025 1657)  Participates in  plan/prevention/treatment measures:   Elevate heels   Discuss with provider PT/OT consult   Increase activity/out of bed for meals  Goal: Prevent/manage excess moisture  Outcome: Progressing  Flowsheets (Taken 6/26/2025 1657)  Prevent/manage excess moisture:   Cleanse incontinence/protect with barrier cream   Monitor for/manage infection if present  Goal: Prevent/minimize sheer/friction injuries  Outcome: Progressing  Flowsheets (Taken 6/26/2025 1657)  Prevent/minimize sheer/friction injuries: Increase activity/out of bed for meals  Goal: Promote/optimize nutrition  Outcome: Progressing  Flowsheets (Taken 6/26/2025 1657)  Promote/optimize nutrition: Monitor/record intake including meals     Problem: Safety - Medical Restraint  Goal: Remains free of injury from restraints (Restraint for Interference with Medical Device)  Outcome: Met  Flowsheets (Taken 6/26/2025 1657)  Remains free of injury from restraints (restraint for interference with medical device): Every 2 hours: Monitor safety, psychosocial status, comfort, nutrition and hydration  Goal: Free from restraint(s) (Restraint for Interference with Medical Device)  Outcome: Met  Flowsheets (Taken 6/26/2025 1657)  Free from restraint(s) (restraint for interference with medical device): Identify and implement measures to help patient regain control

## 2025-06-26 NOTE — NURSING NOTE
Patient arrived to SDU from cath lab after having a brain attack called on them after procedure was completed. Rapid RN and Stroke coordinator accompanied patient followed by Dr. Wilson, and Then Dr. Limon, Full NIH assessments completed. Marked improvement noted from patient's initial symptoms and presentation in the cath lab compared to when up in the room at this time. No emergent plans for TNK at this time due to rapid improvement in symptoms.  Neuro checks ordered and RN will assess as ordered and as needed.

## 2025-06-26 NOTE — CARE PLAN
Problem: General Stroke  Goal: Establish a mutual long term goal with patient by discharge  Outcome: Progressing  Goal: Tolerate enteral feeding throughout shift  Outcome: Progressing  Goal: Out of bed three times today  Outcome: Progressing     Problem: ICU Stroke  Goal: Achieve/maintain targeted sodium level throughout shift  Outcome: Progressing     Problem: Skin  Goal: Participates in plan/prevention/treatment measures  Outcome: Progressing  Goal: Prevent/manage excess moisture  Outcome: Progressing  Flowsheets (Taken 6/26/2025 0436)  Prevent/manage excess moisture: Cleanse incontinence/protect with barrier cream  Goal: Prevent/minimize sheer/friction injuries  Outcome: Progressing  Flowsheets (Taken 6/26/2025 0436)  Prevent/minimize sheer/friction injuries: Use pull sheet  Goal: Promote/optimize nutrition  Outcome: Progressing     Problem: Safety - Medical Restraint  Goal: Remains free of injury from restraints (Restraint for Interference with Medical Device)  Outcome: Progressing  Flowsheets (Taken 6/26/2025 0437)  Remains free of injury from restraints (restraint for interference with medical device): Determine that other, less restrictive measures have been tried or would not be effective before applying the restraint  Goal: Free from restraint(s) (Restraint for Interference with Medical Device)  Outcome: Progressing  Flowsheets (Taken 6/26/2025 0437)  Free from restraint(s) (restraint for interference with medical device): ONCE/SHIFT or MINIMUM Every 12 hours: Assess and document the continuing need for restraints     Problem: General Stroke  Goal: Demonstrate improvement in neurological exam throughout the shift  Outcome: Met  Goal: Maintain BP within ordered limits throughout shift  Outcome: Met  Goal: Participate in treatment (ie., meds, therapy) throughout shift  Outcome: Met  Goal: No symptoms of aspiration throughout shift  Outcome: Met  Goal: No symptoms of hemorrhage throughout shift  Outcome:  Met  Goal: Decreased nausea/vomiting throughout shift  Outcome: Met  Goal: Controlled blood glucose throughout shift  Outcome: Met     Problem: ICU Stroke  Goal: Maintain ICP within ordered limits throughout shift  Outcome: Met  Goal: Maintain patent airway throughout shift  Outcome: Met     Problem: Fall/Injury  Goal: Not fall by end of shift  Outcome: Met  Goal: Be free from injury by end of the shift  Outcome: Met     Problem: Pain  Goal: Takes deep breaths with improved pain control throughout the shift  Outcome: Met  Goal: Turns in bed with improved pain control throughout the shift  Outcome: Met  Goal: Free from opioid side effects throughout the shift  Outcome: Met  Goal: Free from acute confusion related to pain meds throughout the shift  Outcome: Met   The patient's goals for the shift include      The clinical goals for the shift include patient will main SBP under 180 throughout shift

## 2025-06-26 NOTE — PROGRESS NOTES
Occupational Therapy    Occupational Therapy    Evaluation    Patient Name: David Caldwell  MRN: [id number]  Today's Date: 6/26/2025  Time Calculation  Start Time: 1023  Stop Time: 1043  Time Calculation (min): 20 min  2126/2126-A    Assessment  IP OT Assessment  OT Assessment:  (Pt. presents with decreased balance, endurance and cogition impacting pt. ability to complete ADLs and mobility independently)  Prognosis: Good  Barriers to Discharge Home: Caregiver assistance, Physical needs, Cognition needs  Caregiver Assistance: Caregiver assistance needed per identified barriers - however, level of patient's required assistance exceeds assistance available at home  Cognition Needs: 24hr supervision for safety awareness needed, Insight of patient limited regarding functional ability/needs  Physical Needs: Stair navigation into home limited by function/safety, Stair navigation to access bed limited by function/safety, Stair navigation to access bath limited by function/safety, 24hr mobility assistance needed, 24hr ADL assistance needed, High falls risk due to function or environment  Evaluation/Treatment Tolerance: Patient limited by fatigue  End of Session Communication: Bedside nurse  End of Session Patient Position: Up in chair, Alarm on (Sitter at bedside)    Plan:  Treatment Interventions: ADL retraining, Functional transfer training, UE strengthening/ROM, Endurance training  OT Frequency: Daily  OT Discharge Recommendations: High intensity level of continued care  Equipment Recommended upon Discharge: Wheeled walker  OT Recommended Transfer Status: Minimal assist, Assist of 1  OT - OK to Discharge: Yes (Next level of care when cleared by medical team)    Subjective Per EMR:   Patient is a 79 y.o. male admitted on 6/25/2025  9:29 AM to the ICU for post TNK administration monitoring after stroke.     Patient is a 79 y.o. male with a history of myasthenia gravis, permanent atrial fibrillation, CAD (PCI of RCA 2021 in  the setting of NSTEMI, LAD noted to be occluded), dyslipidemia, CVA (thalamic stroke), and hypertension presented to [facility] cath lab for OCT mediated PCI of the RCA with Dr. Alegre today for unstable ungina. After procedure patient experiencing difficulty speaking and following commands, brain attack. CT obtained indicating no acute intercranial hemorrhage or mass effect. Tele Neuro stroke team was consulted along with inpatient Neurology consult placed. Patient since has recovered. He denies any chief complaints.  Patient admitted to medicine service earlier in the day.     Brain attack was called again approximately around 1750 for concern of reemergence of strokelike symptoms.  Patient was nonverbal.  Patient was not directable.  Patient was reevaluated by neurology on-call Dr. Patel as well as teleneurology who plan to get MRI for wake-up stroke protocol.  Patient recently CT head negative.  CTA head and neck showed no LVO.  After shared decision making, neurology recommended TNK administration.  Patient was transferred to ICU from stepdown unit.  Patient blood pressure prior to TNK administration was 130s systolic.  TNK was administered at 1815.  Patient admitted to the ICU for post TNK monitoring.  Current Problem:  1. Stroke-like symptoms  Transthoracic Echo Complete    Transthoracic Echo Complete    CANCELED: Transthoracic Echo Complete    CANCELED: Transthoracic Echo Complete      2. Unstable angina (Multi)  Cardiac Catheterization Procedure    Cardiac Catheterization Procedure      3. Mini stroke  Transthoracic Echo Complete    Transthoracic Echo Complete      4. Other cerebrovascular disease  CANCELED: Transthoracic Echo Complete    CANCELED: Transthoracic Echo Complete          General:  General  Reason for Referral: ADLs, discharge planning  Referred By: Dr. Paige  Family/Caregiver Present: Yes  Caregiver Feedback:  (Son and granddaughter)  Co-Treatment: PT  Co-Treatment  Reason: Safety  Prior to Session Communication: Bedside nurse  Patient Position Received: Bed, 3 rail up, Alarm on  General Comment:  (Sitter at bedside)    Precautions:  Medical Precautions: Fall precautions      Pain:  Pain Assessment  Pain Assessment: 0-10  0-10 (Numeric) Pain Score: 0 - No pain    Objective     Cognition:  Overall Cognitive Status: Within Functional Limits (appears confused at times; states son is brother)  Orientation Level: Oriented X4  Sustained Attention: Impaired  Insight: Moderate  Processing Speed: Delayed             Home Living:  Home Living Comments:  (Pt. lives with spouse in home with 3 entry steps with rail with bedroom and bathroom on second floor with walk in shower with seat and grab bars, has 1/2 bath on first floor. PLOF independent, drives, active (goes to gym a few times a week).)     Prior Function:  Level of [place]: Independent with ADLs and functional transfers  ADL Assistance: Independent  Homemaking Assistance: Independent  Ambulatory Assistance: Independent        ADL:  Grooming Assistance: Stand by  Bathing Assistance: Moderate  LE Dressing Assistance: Moderate    Activity Tolerance:  Endurance: Tolerates 10 - 20 min exercise with multiple rests    Bed Mobility/Transfers:   Bed Mobility  Bed Mobility:  (supine to sit min assist)  Transfers  Transfer:  (sit<>stand min assist with cues for safe hand placement)    Ambulation/Gait Training:  Functional Mobility  Functional Mobility Performed:  (Completes short distance in room with ww and gait belt min assist, cues for ww safety)    Sitting Balance:  Static Sitting Balance  Static Sitting-Balance Support: Bilateral upper extremity supported  Static Sitting-Level of Assistance: Contact guard    Standing Balance:  Static Standing Balance  Static Standing-Balance Support: Bilateral upper extremity supported  Static Standing-Level of Assistance: Minimum assistance      Sensation:  Sensation Comment: Denies  numbness        Perception:  Inattention/Neglect: Appears intact  Initiation: Cues to initiate tasks  Motor Planning: Appears intact  Perseveration: Not present    Coordination:  Movements are Fluid and Coordinated: Yes  Finger to Nose:  (i)  Rapid Alternating Movements: Impaired  Finger to Target: Intact  Coordination Comment: Slowed finger to thumb opposition     Hand Function:  Hand Function  Gross Grasp: Impaired (weak grasp right hand)  Coordination: Impaired (slowed)    Extremities: RUE   RUE : Within Functional Limits and LUE   LUE:  (AROM shoulder flexion ~90 (states this is not new))    Outcome Measures: [facility] Daily Activity  Putting on and taking off regular lower body clothing: A lot  Bathing (including washing, rinsing, drying): A lot  Putting on and taking off regular upper body clothing: A little  Toileting, which includes using toilet, bedpan or urinal: A lot  Taking care of personal grooming such as brushing teeth: A little  Eating Meals: A little  Daily Activity - Total Score: 15                       EDUCATION:  Education  Individual(s) Educated: Patient  Education Provided: Fall precautons, Risk and benefits of OT discussed with patient or other, POC discussed and agreed upon  Patient Response to Education: Patient/Caregiver Verbalized Understanding of Information      Goals:   Encounter Problems       Encounter Problems (Active)       Dressings Lower Extremities       STG - Patient to complete lower body dressing min assist       Start:  06/26/25    Expected End:  07/11/25               Functional Balance       LTG - Patient will maintain standing and sitting balance to allow for completion of daily activities       Start:  06/26/25    Expected End:  07/11/25               Grooming       STG - Patient completes grooming SUP       Start:  06/26/25    Expected End:  07/11/25               OT Transfers       STG - Patient will perform toilet transfer SUP       Start:  06/26/25    Expected End:   07/11/25

## 2025-06-26 NOTE — PROGRESS NOTES
Speech-Language Pathology                 Therapy Communication Note    Patient Name: David Caldwell  MRN: [id number]  Department: [facility] ICU  Room: 2126/2126-A  Today's Date: 6/26/2025     Discipline: Speech Language Pathology    SLP Missed Visit: Yes      Missed Visit Reason: Unavailable (with PT/OT)    Missed Time: Attempt    Comment: Patient undergoing evaluation with PT/OT at time of attempted visit. Will plan to re-attempt speech/language evaluation later today.

## 2025-06-26 NOTE — SIGNIFICANT EVENT
BP as charted post 10mg of labetalol IV. Dr. Limon, Dr. Stanley, and Dr. Paige aware of BP and verbal orders to administer TNK at this time. PAtient moved to ICU for administration.

## 2025-06-26 NOTE — PROGRESS NOTES
"Speech-Language Pathology    SLP Adult Inpatient Speech-Language Evaluation    Patient Name: David Caldwell  MRN: [id number]  Today's Date: 6/26/2025   Time Calculation  Start Time: 1100  Stop Time: 1120  Time Calculation (min): 20 min         Current Problem:   1. Stroke-like symptoms  Transthoracic Echo Complete    Transthoracic Echo Complete    CANCELED: Transthoracic Echo Complete    CANCELED: Transthoracic Echo Complete      2. Unstable angina (Multi)  Cardiac Catheterization Procedure    Cardiac Catheterization Procedure      3. Mini stroke  Transthoracic Echo Complete    Transthoracic Echo Complete      4. Other cerebrovascular disease  CANCELED: Transthoracic Echo Complete    CANCELED: Transthoracic Echo Complete            SLP Assessment:  SLP Assessment  Patient presents with speech/language within functional limits upon completion of assessment this date. Patient performed well in tasks including naming, automatic speech, repetition, yes/no responses, object recognition, following oral and written instructions, picture description, and writing to dictation. Patient's most recent NIHSS was 1, with no aphasia or dysarthria noted. Patient did demonstrate impaired orientation to time (stated month as \"July\" and year as \"1985\" and then \"1995\"), however granddaughter reports that this is baseline. Per this assessment, patient is at baseline function and is able to communicate appropriately, therefore no further skilled ST services are warranted at this time. Further speech therapy services targeting cognition may be considered as warranted at next level of care.  SLP Assessment Results: Other (Comment) (speech/language within functional limits)  Prognosis: Good  Treatment Provided: No  Education Provided: Yes      SLP Plan:  Plan  Inpatient/Swing Bed or Outpatient: Inpatient  SLP TX Plan: Discharge from Speech Therapy  SLP Plan: No skilled SLP  No Skilled SLP: At baseline function  SLP Discharge " Recommendations: Other (Comment) (per PT/OT recommendations)  SLP - OK to Discharge: Yes      Subjective   Patient denied difficulty with word-finding or changes in speech. He reported having some swallowing difficulty around the time of his MG diagnosis, but has not had any issues since that time. Patient noted with low vocal intensity, suspect baseline.    SLP Visit Info:  SLP Received On: 06/26/25      General Visit Information:  General Information  Chart Reviewed: Yes  Arrival: Family/caregiver present  Caregiver Feedback: Granddaughter reports patient's speech is a little slower, but he is almost back to his baseline. RN reports patient passed swallow screen.  Reason for Referral: Aphasia, dysarthria; brain attack, s/p TNK  Referred By: PEYTON Arnold-CNP  Past Medical History Relevant to Rehab: 79 y.o. male with a history of myasthenia gravis, permanent atrial fibrillation, CAD (PCI of RCA 2021 in the setting of NSTEMI, LAD noted to be occluded), dyslipidemia, CVA (thalamic stroke), and hypertension presented to [facility] cath lab on 6/25/25 for OCT mediated PCI of the RCA with Dr. Alegre. Brain attack was called following procedure and patient was admitted to the hospital.  Total Number of Visits : 1  Prior to Session Communication: Bedside nurse      Objective       Pain:  Pain Assessment  Pain Assessment: 0-10  0-10 (Numeric) Pain Score: 0 - No pain      Cognition:  Cognition  Overall Cognitive Status: Within Functional Limits (intermittent mild confusion)  Orientation Level: Disoriented to time  Processing Speed: Delayed    Auditory Comprehension:   Auditory Comprehension  Yes/No Questions: Within Functional Limits  Commands: Within Functional Limits  Conversation: Within Functional Limits    Verbal:  Verbal Expression  Primary Mode of Expression: Verbal  Primary Language: English  Confrontation Naming: Within Functional Limits  Responsive Naming: Within Functional  Limits  Repetition: Within Functional Limits  Open Ended Questions: Within Functional Limits  Conversation: Within Functional Limits    SLP Outcome Measures:    [place] Aphasia Screening Test (MAST): 95/100  Expressive Index: 47/50  -Naming: 10/10  -Automatic Speech: 9/10  -Repetition: 10/10  -Writin/10  -Verbal Fluency: 10/10  Receptive Index: 50/50  -Yes/No Accuracy: 48/20  -Object Recognition: 10/10  -Following Instructions: 10/10  -Reading Instructions: 8/10      Inpatient:    Education:  Learner patient; family   Barriers to Learning acuteness of illness barrier; cognitive limitations barrier   Method demonstration; verbal   Education - Topic ST provided patient education regarding role of ST, purpose of assessment, clinical impressions, and recommendations. Patient verbalized comprehension. ST further coordinated with RN regarding results of this assessment, with RN verbalizing understanding.     Outcome    Verbalized understanding and agreement

## 2025-06-27 VITALS
WEIGHT: 176.37 LBS | RESPIRATION RATE: 30 BRPM | HEART RATE: 74 BPM | OXYGEN SATURATION: 97 % | TEMPERATURE: 97.9 F | DIASTOLIC BLOOD PRESSURE: 87 MMHG | BODY MASS INDEX: 25.25 KG/M2 | SYSTOLIC BLOOD PRESSURE: 165 MMHG | HEIGHT: 70 IN

## 2025-06-27 LAB
ALBUMIN SERPL BCP-MCNC: 3.6 G/DL (ref 3.4–5)
ALP SERPL-CCNC: 63 U/L (ref 33–136)
ALT SERPL W P-5'-P-CCNC: 12 U/L (ref 10–52)
ANION GAP SERPL CALC-SCNC: 12 MMOL/L (ref 10–20)
AST SERPL W P-5'-P-CCNC: 39 U/L (ref 9–39)
BILIRUB SERPL-MCNC: 1.8 MG/DL (ref 0–1.2)
BUN SERPL-MCNC: 20 MG/DL (ref 6–23)
CALCIUM SERPL-MCNC: 9.2 MG/DL (ref 8.6–10.3)
CHLORIDE SERPL-SCNC: 104 MMOL/L (ref 98–107)
CO2 SERPL-SCNC: 24 MMOL/L (ref 21–32)
CREAT SERPL-MCNC: 0.88 MG/DL (ref 0.5–1.3)
EGFRCR SERPLBLD CKD-EPI 2021: 87 ML/MIN/1.73M*2
ERYTHROCYTE [DISTWIDTH] IN BLOOD BY AUTOMATED COUNT: 14.7 % (ref 11.5–14.5)
GLUCOSE SERPL-MCNC: 102 MG/DL (ref 74–99)
HCT VFR BLD AUTO: 38.3 % (ref 41–52)
HGB BLD-MCNC: 12.4 G/DL (ref 13.5–17.5)
HOLD SPECIMEN: NORMAL
MAGNESIUM SERPL-MCNC: 2.1 MG/DL (ref 1.6–2.4)
MCH RBC QN AUTO: 30.2 PG (ref 26–34)
MCHC RBC AUTO-ENTMCNC: 32.4 G/DL (ref 32–36)
MCV RBC AUTO: 93 FL (ref 80–100)
NRBC BLD-RTO: 0 /100 WBCS (ref 0–0)
PHOSPHATE SERPL-MCNC: 2.6 MG/DL (ref 2.5–4.9)
PLATELET # BLD AUTO: 202 X10*3/UL (ref 150–450)
POTASSIUM SERPL-SCNC: 3.7 MMOL/L (ref 3.5–5.3)
PROT SERPL-MCNC: 7.1 G/DL (ref 6.4–8.2)
RBC # BLD AUTO: 4.11 X10*6/UL (ref 4.5–5.9)
SODIUM SERPL-SCNC: 136 MMOL/L (ref 136–145)
WBC # BLD AUTO: 7.7 X10*3/UL (ref 4.4–11.3)

## 2025-06-27 PROCEDURE — 2500000001 HC RX 250 WO HCPCS SELF ADMINISTERED DRUGS (ALT 637 FOR MEDICARE OP): Performed by: INTERNAL MEDICINE

## 2025-06-27 PROCEDURE — 99239 HOSP IP/OBS DSCHRG MGMT >30: CPT | Performed by: STUDENT IN AN ORGANIZED HEALTH CARE EDUCATION/TRAINING PROGRAM

## 2025-06-27 PROCEDURE — 2500000004 HC RX 250 GENERAL PHARMACY W/ HCPCS (ALT 636 FOR OP/ED): Performed by: INTERNAL MEDICINE

## 2025-06-27 PROCEDURE — 84100 ASSAY OF PHOSPHORUS: CPT | Performed by: INTERNAL MEDICINE

## 2025-06-27 PROCEDURE — 36415 COLL VENOUS BLD VENIPUNCTURE: CPT | Performed by: INTERNAL MEDICINE

## 2025-06-27 PROCEDURE — 99233 SBSQ HOSP IP/OBS HIGH 50: CPT | Performed by: PSYCHIATRY & NEUROLOGY

## 2025-06-27 PROCEDURE — 83735 ASSAY OF MAGNESIUM: CPT | Performed by: INTERNAL MEDICINE

## 2025-06-27 PROCEDURE — 80053 COMPREHEN METABOLIC PANEL: CPT | Performed by: INTERNAL MEDICINE

## 2025-06-27 PROCEDURE — 85027 COMPLETE CBC AUTOMATED: CPT | Performed by: INTERNAL MEDICINE

## 2025-06-27 RX ORDER — CLOPIDOGREL BISULFATE 75 MG/1
75 TABLET ORAL DAILY
Status: DISCONTINUED | OUTPATIENT
Start: 2025-06-27 | End: 2025-06-27 | Stop reason: HOSPADM

## 2025-06-27 RX ORDER — CLOPIDOGREL BISULFATE 75 MG/1
75 TABLET ORAL DAILY
Qty: 30 TABLET | Refills: 2 | Status: SHIPPED | OUTPATIENT
Start: 2025-06-28 | End: 2025-09-26

## 2025-06-27 RX ORDER — RIVAROXABAN 20 MG/1
20 TABLET, FILM COATED ORAL DAILY
Start: 2025-06-28

## 2025-06-27 RX ORDER — NAPROXEN SODIUM 220 MG/1
81 TABLET, FILM COATED ORAL DAILY
Qty: 30 TABLET | Refills: 0 | Status: SHIPPED | OUTPATIENT
Start: 2025-06-28 | End: 2025-07-28

## 2025-06-27 RX ADMIN — HEPARIN SODIUM 5000 UNITS: 5000 INJECTION, SOLUTION INTRAVENOUS; SUBCUTANEOUS at 04:01

## 2025-06-27 RX ADMIN — AZATHIOPRINE 100 MG: 50 TABLET ORAL at 09:07

## 2025-06-27 RX ADMIN — ASPIRIN 81 MG CHEWABLE TABLET 81 MG: 81 TABLET CHEWABLE at 09:07

## 2025-06-27 RX ADMIN — AMLODIPINE BESYLATE 5 MG: 5 TABLET ORAL at 09:07

## 2025-06-27 ASSESSMENT — COGNITIVE AND FUNCTIONAL STATUS - GENERAL
DAILY ACTIVITIY SCORE: 20
STANDING UP FROM CHAIR USING ARMS: A LITTLE
CLIMB 3 TO 5 STEPS WITH RAILING: A LITTLE
WALKING IN HOSPITAL ROOM: A LITTLE
TOILETING: A LITTLE
HELP NEEDED FOR BATHING: A LITTLE
PERSONAL GROOMING: A LITTLE
DRESSING REGULAR LOWER BODY CLOTHING: A LITTLE
DRESSING REGULAR LOWER BODY CLOTHING: A LITTLE
MOBILITY SCORE: 20
DAILY ACTIVITIY SCORE: 23
MOVING TO AND FROM BED TO CHAIR: A LITTLE

## 2025-06-27 ASSESSMENT — PAIN SCALES - GENERAL
PAINLEVEL_OUTOF10: 0 - NO PAIN
PAINLEVEL_OUTOF10: 0 - NO PAIN

## 2025-06-27 ASSESSMENT — ACTIVITIES OF DAILY LIVING (ADL): HOME_MANAGEMENT_TIME_ENTRY: 10

## 2025-06-27 ASSESSMENT — PAIN - FUNCTIONAL ASSESSMENT
PAIN_FUNCTIONAL_ASSESSMENT: 0-10
PAIN_FUNCTIONAL_ASSESSMENT: CPOT (CRITICAL CARE PAIN OBSERVATION TOOL)

## 2025-06-27 NOTE — NURSING NOTE
Pt brought from ICU to room 2118 with 2 ICU nurses as transport. Granddaughter brought to room with patients belongings as well. Pt transferred into new bed x1 assist standing. Bedside report given to nurse Craig.

## 2025-06-27 NOTE — CARE PLAN
Problem: Discharge Planning  Goal: Discharge to home or other facility with appropriate resources  Outcome: Adequate for Discharge     Problem: Chronic Conditions and Co-morbidities  Goal: Patient's chronic conditions and co-morbidity symptoms are monitored and maintained or improved  Outcome: Adequate for Discharge     Problem: General Stroke  Goal: Establish a mutual long term goal with patient by discharge  Outcome: Adequate for Discharge  Goal: Tolerate enteral feeding throughout shift  Outcome: Adequate for Discharge  Goal: Out of bed three times today  Outcome: Adequate for Discharge     Problem: ICU Stroke  Goal: Achieve/maintain targeted sodium level throughout shift  Outcome: Adequate for Discharge     Problem: Skin  Goal: Participates in plan/prevention/treatment measures  Outcome: Adequate for Discharge  Goal: Prevent/manage excess moisture  Outcome: Adequate for Discharge  Goal: Prevent/minimize sheer/friction injuries  Outcome: Adequate for Discharge  Goal: Promote/optimize nutrition  Outcome: Adequate for Discharge   The patient's goals for the shift include      The clinical goals for the shift include patient will have no neuro defictis this shift.  Patient met all goals prior to discharge.

## 2025-06-27 NOTE — PROGRESS NOTES
"David Caldwell is a 79 y.o. male on day 2 of admission presenting with Stroke-like symptoms.    Subjective   No overnight events;Patient is doing well    Objective     Last Recorded Vitals  Blood pressure 140/76, pulse 74, temperature 36.6 °C (97.9 °F), temperature source Temporal, resp. rate 15, height 1.778 m (5' 10\"), weight 80 kg (176 lb 5.9 oz), SpO2 97%.    NIHSS     Physical Exam  Neurological Exam    Gen: NAD  Neuro:  --HIF: A&O X 3, repetition and naming intact  --CN:  PERRLA, EOMI, VFF, no visible facial asymmetry, facial sensation intact, no tongue or palatal deviation, SCM intact  --Motor: Moves all 4 extremities equally; no focal deficits  --Sensory: Intact to light touch, intact to pinprick  --Reflex: 2+ symmetric, toes down  --Cerebellum: FTN and HTS intact  --Gait: Deferred     Relevant Results    NIH Stroke Scale  1A. Level of Consciousness: Alert, Keenly Responsive  1B. Ask Month and Age: Both Questions Right  1C. Blink Eyes & Squeeze Hands: Performs Both Tasks  2. Best Gaze: Normal  3. Visual: No Visual Loss  4. Facial Palsy: Normal Symmetrical Movements  5A. Motor - Left Arm: No Drift  5B. Motor - Right Arm: No Drift  6A. Motor - Left Leg: No Drift  6B. Motor - Right Leg: No Drift  7. Limb Ataxia: Absent  8. Sensory Loss: Normal  9. Best Language: No Aphasia  10. Dysarthria: Normal  11. Extinction and Inattention: No Abnormality  NIH Stroke Scale: 0           [place] Coma Scale  Best Eye Response: Spontaneous  Best Verbal Response: Oriented  Best Motor Response: Follows commands  [place] Coma Scale Score: 15                       MRI Brain (I personally reviewed the images/tracings with the following interpretation)  Multifocal, small cerebral infarcts    CT Head (I personally reviewed the images/tracings with the following interpretation)  No evidence of ICH           This patient currently has cardiac telemetry ordered; if you would like to modify or discontinue the telemetry order, click " here to go to the orders activity to modify/discontinue the order.  Assessment & Plan  Stroke-like symptoms    CAD S/P percutaneous coronary angioplasty    Benign essential hypertension    Dyslipidemia    Myasthenia gravis    Permanent atrial fibrillation (Multi)     Acute Ischemic Stroke s/p TNK, cardioembolic  - repeat head CT was negative  - multiple small infarcts noted on MRI  - recommend re-starting Xareltoy  - okay to also start Plavix and Aspirin  - continue aggressive control of vascular risk factors  - okay for discharge  - follow up with his Neurologist at [facility] in 6-8 weeks    Message hospitalist team with my recommendations      Delvin Patel MD

## 2025-06-27 NOTE — DISCHARGE INSTRUCTIONS
Take aspirin 81mg daily for 1 month and stop  Take Plavix 75 mg daily until advised by cardiology    Continue taking your home Xarelto starting 6/28    Follow up with cardiology and your [facility] neurologist

## 2025-06-27 NOTE — PROGRESS NOTES
06/27/25 1513   Intensity of Service   Intensity of Service 0-30 min     Discharge home. Requested Rx for outpatient physical therapy. Dr. Sanford notified.  Rx on discharge instructions.

## 2025-06-27 NOTE — DISCHARGE SUMMARY
Discharge Diagnosis  Acute Stroke  Coronary artery disease s/p PCI    Issues Required Follow-Up  Take aspirin 81mg daily and plavix 75 mg daily, then stop aspirin after one month    Continue taking home Xarelto    Outpatient follow up with cardiology and neurology       Test Results Pending At Discharge  Pending Labs       No current pending labs.            Hospital Course  Patient is an 79-year-old male with medical history significant for myasthenia gravis, permanent atrial fibrillation on Xarelto, coronary artery disease, hypertension who presented to the hospital  for outpatient cardiac catheterization for angina workup.  He underwent PCI to RCA.  Following his catheterization, stroke alert was called for confusion and difficulty speaking.  He was evaluated by neurology and given TNK.  His symptoms improved and he returned to his baseline. MRI brain confirmed multiple small infarcts bilaterally. Following TNK,  neurology gave clearance for resumption of aspirin, Plavix and Xarelto.  Per cardiology, he will take aspirin and Plavix together with discontinuation of aspirin after 1 month.  He is statin intolerant.  He will follow-up as outpatient with neurology and cardiology.    Greater than 30 minutes was spent facilitating this patients discharge from the hospital which included examining the patient, reconciling medications, and making arrangements for future care.         Pertinent Physical Exam At Time of Discharge  Physical Exam    Constitutional: Well developed, no distress, alert and cooperative  Skin: Warm and dry. Ecchymoses over L arm   Eyes: EOMI, clear sclera  ENMT: mucous membranes moist  Respiratory: Patent airways, CTAB  Cardiovascular: Regular, rate and rhythm, no murmurs  Abdominal: Nondistended, soft, non-tender, +BS  MSK: ROM intact  Neuro: alert and oriented x3      Home Medications     Medication List      START taking these medications     aspirin 81 mg chewable tablet; Chew and swallow  1 tablet (81 mg) once   daily.; Start taking on: June 28, 2025   clopidogrel 75 mg tablet; Commonly known as: Plavix; Take 1 tablet (75   mg) by mouth once daily. Do not fill before June 28, 2025.; Start taking   on: June 28, 2025     CHANGE how you take these medications     Xarelto 20 mg tablet; Generic drug: rivaroxaban; Take 1 tablet (20 mg)   by mouth once daily. Do not fill before June 28, 2025.; Start taking on:   June 28, 2025; What changed: when to take this     CONTINUE taking these medications     amLODIPine 5 mg tablet; Commonly known as: Norvasc; Take 1 tablet (5 mg)   by mouth once daily.   azaTHIOprine 50 mg tablet; Commonly known as: Imuran       Outpatient Follow-Up  Future Appointments   Date Time Provider Department Center   9/19/2025  2:20 PM Mason Interiano DO [id number]   1/6/2026  2:15 PM Eduardo Alegre MD [id number] [place]       Greater than 30 minutes was spent facilitating this patients discharge from the hospital which included examining the patient, reconciling medications, and making arrangements for future care.    Shda Sanford DO  [facility] [place], [place]  Internal Medicine    This document was generated in whole or in part using the Dragon One medical voice recognition software and there may be some incorrect words/wording, spelling, or punctuation errors that were not corrected prior to finalization in the medical record.

## 2025-06-27 NOTE — PROGRESS NOTES
Occupational Therapy    OT Treatment    Patient Name: David Caldwell  MRN: [id number]  Today's Date: 6/27/2025  Time Calculation  Start Time: 1411  Stop Time: 1421  Time Calculation (min): 10 min       2118/2118-A    Assessment:  OT Assessment: Discussed home going with problem solving as needed with patient and his granddaughter.  Both feel confident in their ability to manage safely at home. Patient will have a family member staying with them for an unspecified lenght of time.  End of Session Patient Position:  (Patient remained in bed which is where he was at beginning of session.)       Plan:  OT Frequency: Daily     Subjective     Current Problem:  Problem List[1]    General:  OT Received On: 06/27/25  Reason for Referral: decreased ADLs  Referred By: MOHAN Arnold  Past Medical History Relevant to Rehab: 79 y.o. male with a history of myasthenia gravis, permanent atrial fibrillation, CAD (PCI of RCA 2021 in the setting of NSTEMI, LAD noted to be occluded), dyslipidemia, CVA (thalamic stroke), and hypertension presented to [facility] cath lab on 6/25/25 for OCT mediated PCI of the RCA with Dr. Alegre. Brain attack was called following procedure and patient was admitted to the hospital.  Family/Caregiver Present: Yes  Caregiver Feedback: Patient's granddaughter present and provided input as needed.  Both patient and his granddaughter feel confident in their ability to manage safely at home.    Vital Signs:       Pain:     Objective      Activities of Daily Living:                   Toileting  Toileting Comments: Patient stated he has difficulty with hygiene follow a BM secondary to  limited reach.  Educated patient and his granddaughter in use of a toilet aid and provided outside sources to purchase it from.    Functional Standing Tolerance:       Bed Mobility/Transfers: Bed Mobility  Bed Mobility: No (Patient wished to remain in bed and did not wish to transfer to the recliner  to eat his late lunch.  Patient's granddaughter was set up in the recliner and appeared to be working.)  Transfers  Transfer: No                Therapy/Activity:               Strength:       Other Activity:       Outcome Measures:[facility] Daily Activity  Putting on and taking off regular lower body clothing: A little  Bathing (including washing, rinsing, drying): A little  Putting on and taking off regular upper body clothing: None  Toileting, which includes using toilet, bedpan or urinal: A little  Taking care of personal grooming such as brushing teeth: A little  Eating Meals: None  Daily Activity - Total Score: 20  Education Documentation  No documentation found.  Education Comments  No comments found.           EDUCATION:  Education  Individual(s) Educated: Patient  Education Provided: Fall precautons, Risk and benefits of OT discussed with patient or other, POC discussed and agreed upon  Patient Response to Education: Patient/Caregiver Verbalized Understanding of Information    Goals:  Encounter Problems       Encounter Problems (Active)       Dressings Lower Extremities       STG - Patient to complete lower body dressing min assist (Progressing)       Start:  06/26/25    Expected End:  07/11/25               Functional Balance       LTG - Patient will maintain standing and sitting balance to allow for completion of daily activities (Progressing)       Start:  06/26/25    Expected End:  07/11/25               Grooming       STG - Patient completes grooming SUP (Progressing)       Start:  06/26/25    Expected End:  07/11/25               OT Transfers       STG - Patient will perform toilet transfer SUP (Progressing)       Start:  06/26/25    Expected End:  07/11/25                              [1]   Patient Active Problem List  Diagnosis    Abdominal pain, epigastric    Abnormal CT of the abdomen    Other dietary vitamin B12 deficiency anemia    Permanent atrial fibrillation (Multi)    Bilateral plantar wart     BPH (benign prostatic hyperplasia)    CAD S/P percutaneous coronary angioplasty    Calcium nephrolithiasis    Cervicalgia    Fatigue    Gait disturbance    GERD (gastroesophageal reflux disease)    Benign essential hypertension    Dyslipidemia    Low iron    Migraine    Myasthenia gravis    Persistent testicular pain    Tinea pedis, recurrent    Anemia    Deficiency anemia    Cerebral infarction    Cervical spondylosis without myelopathy    Claustrophobia    Closed fracture of scaphoid bone of wrist    Cognitive communication deficit    Cranial nerve disorder    Dark stools    Diplopia    GUAJARDO (dyspnea on exertion)    Long-term use of immunosuppressant medication    Lumbosacral spondylosis without myelopathy    Malaise and fatigue    Memory loss    CATHI (obstructive sleep apnea)    Pain in joint involving pelvic region and thigh    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

## 2025-06-27 NOTE — CARE PLAN
Pt remained hds throughout the shift. VS stable. Remained AFIB, maintained room air. No complaints of pain during the night. Pt up to chair during the shift, tolerates activity well. Q4h neuro checks completed per order, no deficits, PERRLA, sensation and movement WDL. Safety maintained.    Problem: Discharge Planning  Goal: Discharge to home or other facility with appropriate resources  6/27/2025 0534 by Kiara Aguilar RN  Outcome: Progressing     Problem: Chronic Conditions and Co-morbidities  Goal: Patient's chronic conditions and co-morbidity symptoms are monitored and maintained or improved  6/27/2025 0534 by Kiara Aguilar RN  Outcome: Progressing     Problem: Skin  Goal: Prevent/manage excess moisture  6/27/2025 0534 by Kiara Aguilar RN  Outcome: Progressing     Problem: Skin  Goal: Prevent/minimize sheer/friction injuries  6/27/2025 0534 by Kiara Aguilar RN  Outcome: Progressing     Problem: Skin  Goal: Promote/optimize nutrition  6/27/2025 0534 by Kiara Aguilar RN  Outcome: Progressing    The clinical goals for the shift include Pt will remain hds throughout the shift.

## 2025-06-29 LAB
Q ONSET: 227 MS
QRS COUNT: 8 BEATS
QRS DURATION: 96 MS
QT INTERVAL: 468 MS
QTC CALCULATION(BAZETT): 409 MS
QTC FREDERICIA: 428 MS
R AXIS: 33 DEGREES
T AXIS: 68 DEGREES
T OFFSET: 461 MS
VENTRICULAR RATE: 46 BPM

## 2025-06-30 ENCOUNTER — PATIENT OUTREACH (OUTPATIENT)
Dept: PRIMARY CARE | Facility: CLINIC | Age: 80
End: 2025-06-30
Payer: MEDICARE

## 2025-06-30 ENCOUNTER — TELEPHONE (OUTPATIENT)
Dept: PRIMARY CARE | Facility: CLINIC | Age: 80
End: 2025-06-30
Payer: MEDICARE

## 2025-06-30 NOTE — PROGRESS NOTES
Discharge Facility: [facility]  Discharge Diagnosis: Acute stroke, CAD s/p PCI   Admission Date: 6/25/2025  Discharge Date: 6/27/2025    PCP Appointment Date:  -unable to schedule d/t no available appt; task sent to office clerical pool to assist with follow up  -9/19/2025 1420    Specialist Appointment Date:   -cardiology to be scheduled    Hospital Encounter and Summary Linked: Yes    Admission (Discharged) with Shad Sanford DO (06/25/2025)     See discharge assessment below for further details    Wrap Up  Wrap Up Additional Comments: Pt was admitted to [facility] 6/25-6/27/2025 for acute stroke after outpatient cardiac catheterization. Pt reports he is improving slowly. Pt discharged with prescriptions for aspirin and plavix; pt has resumed xarelto. Pt denies questions or issues regarding medication and reports he will be picking up plavix today from pharmacy. Pt reports understanding of discharge instructions. Pt encouraged to call to schedule cardiology follow up. Unable to schedule PCP appt d/t no available appt; task sent to office clerical pool to assist with follow up. Pt denies any questions or needs at this time. He is encouraged to call if questions or needs arise. (6/30/2025 11:03 AM)  Call End Time: 1103 (6/30/2025 11:03 AM)    Engagement  Call Start Time: 1055 (6/30/2025 11:03 AM)    Medications  Medications reviewed with patient/caregiver?: Yes (new prescriptions reviewed; aspirin, plavix, xarelto) (6/30/2025 11:03 AM)  Is the patient having any side effects they believe may be caused by any medication additions or changes?: No (6/30/2025 11:03 AM)  Does the patient have all medications ordered at discharge?: Yes (6/30/2025 11:03 AM)  Care Management Interventions: No intervention needed (6/30/2025 11:03 AM)  Prescription Comments: pt states his plavix was not ready and he will be picking up from pharmacy today (6/30/2025 11:03 AM)  Is the patient taking all medications as directed (includes  completed medication regime)?: Yes (6/30/2025 11:03 AM)    Appointments  Does the patient have a primary care provider?: Yes (6/30/2025 11:03 AM)  Care Management Interventions: Advised patient to make appointment (6/30/2025 11:03 AM)  Has the patient kept scheduled appointments due by today?: Yes (6/30/2025 11:03 AM)  Care Management Interventions: Advised to schedule with specialist (6/30/2025 11:03 AM)    Self Management  Has home health visited the patient within 72 hours of discharge?: Not applicable (6/30/2025 11:03 AM)    Patient Teaching  Does the patient have access to their discharge instructions?: Yes (6/30/2025 11:03 AM)  Care Management Interventions: Reviewed instructions with patient (6/30/2025 11:03 AM)  What is the patient's perception of their health status since discharge?: Improving (6/30/2025 11:03 AM)  Is the patient/caregiver able to teach back the hierarchy of who to call/visit for symptoms/problems? PCP, Specialist, Home Health nurse, Urgent Care, ED, 911: Yes (6/30/2025 11:03 AM)

## 2025-07-03 RX ORDER — LORAZEPAM 2 MG/ML
1 INJECTION INTRAMUSCULAR ONCE
Status: COMPLETED | OUTPATIENT
Start: 2025-06-25 | End: 2025-06-25

## 2025-07-09 ENCOUNTER — APPOINTMENT (OUTPATIENT)
Dept: PRIMARY CARE | Facility: CLINIC | Age: 80
End: 2025-07-09
Payer: MEDICARE

## 2025-07-09 VITALS
RESPIRATION RATE: 14 BRPM | HEART RATE: 63 BPM | TEMPERATURE: 97.6 F | OXYGEN SATURATION: 97 % | WEIGHT: 171 LBS | DIASTOLIC BLOOD PRESSURE: 70 MMHG | BODY MASS INDEX: 24.48 KG/M2 | HEIGHT: 70 IN | SYSTOLIC BLOOD PRESSURE: 132 MMHG

## 2025-07-09 DIAGNOSIS — G70.00 MYASTHENIA GRAVIS: ICD-10-CM

## 2025-07-09 DIAGNOSIS — I10 BENIGN ESSENTIAL HYPERTENSION: ICD-10-CM

## 2025-07-09 DIAGNOSIS — E53.8 B12 DEFICIENCY: Primary | ICD-10-CM

## 2025-07-09 DIAGNOSIS — I65.22 LEFT CAROTID STENOSIS: ICD-10-CM

## 2025-07-09 DIAGNOSIS — I63.10 CEREBRAL INFARCTION DUE TO EMBOLISM OF PRECEREBRAL ARTERY (MULTI): ICD-10-CM

## 2025-07-09 DIAGNOSIS — I25.10 CAD S/P PERCUTANEOUS CORONARY ANGIOPLASTY: ICD-10-CM

## 2025-07-09 DIAGNOSIS — I48.21 PERMANENT ATRIAL FIBRILLATION (MULTI): ICD-10-CM

## 2025-07-09 DIAGNOSIS — Z98.61 CAD S/P PERCUTANEOUS CORONARY ANGIOPLASTY: ICD-10-CM

## 2025-07-09 PROCEDURE — 1159F MED LIST DOCD IN RCRD: CPT | Performed by: INTERNAL MEDICINE

## 2025-07-09 PROCEDURE — 1111F DSCHRG MED/CURRENT MED MERGE: CPT | Performed by: INTERNAL MEDICINE

## 2025-07-09 PROCEDURE — 1160F RVW MEDS BY RX/DR IN RCRD: CPT | Performed by: INTERNAL MEDICINE

## 2025-07-09 PROCEDURE — 3075F SYST BP GE 130 - 139MM HG: CPT | Performed by: INTERNAL MEDICINE

## 2025-07-09 PROCEDURE — 3078F DIAST BP <80 MM HG: CPT | Performed by: INTERNAL MEDICINE

## 2025-07-09 PROCEDURE — 99495 TRANSJ CARE MGMT MOD F2F 14D: CPT | Performed by: INTERNAL MEDICINE

## 2025-07-09 PROCEDURE — 1036F TOBACCO NON-USER: CPT | Performed by: INTERNAL MEDICINE

## 2025-07-09 PROCEDURE — 96372 THER/PROPH/DIAG INJ SC/IM: CPT | Performed by: INTERNAL MEDICINE

## 2025-07-09 RX ORDER — ROSUVASTATIN CALCIUM 5 MG/1
5 TABLET, COATED ORAL DAILY
Qty: 100 TABLET | Refills: 3 | Status: SHIPPED | OUTPATIENT
Start: 2025-07-09 | End: 2026-08-13

## 2025-07-09 RX ORDER — CYANOCOBALAMIN 1000 UG/ML
1000 INJECTION, SOLUTION INTRAMUSCULAR; SUBCUTANEOUS ONCE
Status: COMPLETED | OUTPATIENT
Start: 2025-07-09 | End: 2025-07-09

## 2025-07-09 RX ADMIN — CYANOCOBALAMIN 1000 MCG: 1000 INJECTION, SOLUTION INTRAMUSCULAR; SUBCUTANEOUS at 11:34

## 2025-07-09 ASSESSMENT — ENCOUNTER SYMPTOMS
CONSTIPATION: 0
ABDOMINAL PAIN: 0
SHORTNESS OF BREATH: 0
DIARRHEA: 0
PALPITATIONS: 0
NAUSEA: 0

## 2025-07-09 ASSESSMENT — PATIENT HEALTH QUESTIONNAIRE - PHQ9
SUM OF ALL RESPONSES TO PHQ9 QUESTIONS 1 AND 2: 0
1. LITTLE INTEREST OR PLEASURE IN DOING THINGS: NOT AT ALL
2. FEELING DOWN, DEPRESSED OR HOPELESS: NOT AT ALL

## 2025-07-09 NOTE — PROGRESS NOTES
"Subjective   Patient ID: David Caldwell is a 79 y.o. male who presents for Hospital Follow-up.    Discharge Facility: [facility]  Discharge Diagnosis: Acute stroke, CAD s/p PCI   Admission Date: 6/25/2025  Discharge Date: 6/27/2025      TCM phone call was made within 2 days of discharge.  Patient is being seen within 2 weeks of discharge.    Hospital records were reviewed.  His memory of the events immediately after the catheterization and during the hospitalization are somewhat foggy and he admits this.  Appears he had an acute stroke immediately following his cardiac catheterization.  He had stopped his Xarelto just the day before.  He admits to missing doses of Xarelto in the past but does not think he missed a dose for a month before this.  Again, reviewed discussed his hospital records.  Since being home he feels he has been doing quite well.  He denies any focal neurologic symptom but does admit to some generalized weakness and fatigue.  In particular, he denies any issues with chest pain, shortness of breath or dizzy spells.  No unilateral leg or arm weakness.    Review of Systems   Respiratory:  Negative for shortness of breath.    Cardiovascular:  Negative for chest pain and palpitations.   Gastrointestinal:  Negative for abdominal pain, constipation, diarrhea and nausea.       Objective   /70 (BP Location: Right arm, Patient Position: Sitting, BP Cuff Size: Adult)   Pulse 63   Temp 36.4 °C (97.6 °F) (Tympanic)   Resp 14   Ht 1.778 m (5' 10\")   Wt 77.6 kg (171 lb)   SpO2 97%   BMI 24.54 kg/m²     Physical Exam  Vitals reviewed.   Constitutional:       Appearance: Normal appearance.   HENT:      Head: Normocephalic.   Eyes:      Pupils: Pupils are equal, round, and reactive to light.   Cardiovascular:      Rate and Rhythm: Normal rate and regular rhythm.   Pulmonary:      Effort: Pulmonary effort is normal.      Breath sounds: Normal breath sounds.   Musculoskeletal:         General: Normal " range of motion.   Neurological:      General: No focal deficit present.      Mental Status: He is alert.   Psychiatric:         Mood and Affect: Mood normal.         Assessment/Plan   Problem List Items Addressed This Visit           ICD-10-CM    Permanent atrial fibrillation (Multi) I48.21    CAD S/P percutaneous coronary angioplasty I25.10, Z98.61    Relevant Medications    rosuvastatin (Crestor) 5 mg tablet    Benign essential hypertension I10    Myasthenia gravis G70.00    Cerebral infarction I63.9    Relevant Medications    rosuvastatin (Crestor) 5 mg tablet     Other Visit Diagnoses         Codes      B12 deficiency    -  Primary E53.8    Relevant Medications    cyanocobalamin (Vitamin B-12) injection 1,000 mcg (Completed)      Left carotid stenosis     I65.22    Relevant Medications    rosuvastatin (Crestor) 5 mg tablet    Other Relevant Orders    MR angio neck w IV contrast        Reviewed and discussed all the above.  So rather complex medical patient with underlying coronary artery disease, A-fib and myasthenia gravis.  He recently had an acute stroke which based on MRI findings seems to be embolic in nature.  Again, underlying A-fib he did stop his Xarelto, but this was just for 1 day prior to the event.  Upon review of hospital records he appears to have a possible stenosis of his left internal carotid artery.  At this point in time we will have him start a statin and he is agreeable upon further discussion.  Will also proceed with an MRA for further delineation of this abnormality.  Will continue his other medications including his Xarelto.  He does follow with his MG specialist at the [facility].  Will see him back in 1 to 2 months, sooner if any issues or changes.

## 2025-07-10 ENCOUNTER — TELEPHONE (OUTPATIENT)
Dept: PRIMARY CARE | Facility: CLINIC | Age: 80
End: 2025-07-10
Payer: MEDICARE

## 2025-07-10 NOTE — PROGRESS NOTES
Got it thank you Chief Complaint:   No chief complaint on file.     History Of Present Illness:    David Caldwell is a 79 y.o. male with a history of myasthenia gravis, permanent atrial fibrillation, CAD (PCI of RCA 2021 and 2025, LAD known to be occluded), dyslipidemia, statin induced myalgias, CVA, and hypertension here for follow-up.    Tells me that he is still weak but he is feeling slightly better after being instructed by his primary care physician to start walking a little more.  His wife is still recovering from her recent surgery.  She is at [facility] rehab.  He has been trying to walk the halls a little bit more.  He does have [employer] bicycle at home and has a membership to [employer] but has not been using either of these.    Tells me that he is still huffing and puffing after walking 10 to 20 feet but thinks it might be a little bit better than it was prior to his stenting.  Denies any chest pain or palpitations.  Denies any weakness.    Still weak   Hasn't been exercising much  Wife is at [facility] re[facility]ing her ankle  [employer] at home  [employer]    After his recent catheterization he had confusion and expressive aphasia. Seen by neurology and after CT scans revealing no ICH the patient was given tPA.  Symptoms resolved.  Subsequent MRI demonstrated multifocal, small cerebral infarcts.  He was eventually able to restart his aspirin and clopidogrel.    Prior to his STEMI in 2021 he was at [employer] [facility] at 4:30 mass.  He had ordered a pizza at [employer] by Brian.  He was talking to one of his friends after mass at the time slipped away.  He decided to hurry from the [employer] to his car which was parked at the [employer] office.  He felt somewhat winded when he got there.  He went and got the pizza and drove at home.  By the time he got it inside and sat it down he just did not feel well.  He sat down in his chair and told his wife that he needed her to call EMS.  Apparently  afterwards he developed some shortness of breath but does not specifically recall chest pain.  The notes from that visit states that he had chest pain.    Tried pravastatin and had muscle aches.  Also had myalgias with atorvastatin, simvastatin, and ezetimibe.    Had a CVA in November 2023.  Went to the [facility].  Saw a neurologist afterwards who recommended that he be on statin.  The patient says that he had been on atorvastatin and rosuvastatin in the past however had significant bone pain and muscle pain.  He has not been taking the rosuvastatin even though it is listed.    He tells me that he graduated from [employer] and went to college at [employer].    Echocardiogram 6/26/2025: EF 55 to 60%.  Mild AR.  Moderate left atrial enlargement.  Mild to moderate MR and TR.  RVSP 21 mmHg.    PCI to RCA 6/25/2025: Proximal RCA stent with Synergy 4 x 38 mm KENYA.  Procedure complicated by postprocedural stroke.    Lexiscan nuclear stress test 10/25/2024: Moderate area of moderate to severe perfusion defect in the mid anterior wall extending into the apex concerning for ischemia of the LAD territory.  EF 61%.  Patient with known occluded LAD by catheterization in 2021.  Findings of nuclear stress test are consistent with known occluded LAD.    Echocardiogram 9/5/2021: EF 55 to 60%.  Grade 1 diastolic dysfunction.  Mild to moderate left atrial enlargement.    PCI to RCA 9/4/2021: 3.5 x 18 mm Jian KENYA.  LAD occluded proximally.  Large dominant RCA with 90% mid stenosis.       Allergies:  Morphine, Onion, Opioids-meperidine and related, Penicillins, Rosuvastatin, and Opioids - morphine analogues    Outpatient Medications:  Current Outpatient Medications   Medication Instructions    amLODIPine (NORVASC) 5 mg, oral, Daily    aspirin 81 mg, oral, Daily    azaTHIOprine (Imuran) 50 mg tablet 2 tablets, Daily    clopidogrel (PLAVIX) 75 mg, oral, Daily    rosuvastatin (CRESTOR) 5 mg, oral, Daily    Xarelto 20 mg,  "oral, Daily       Last Recorded Vitals:  Visit Vitals  /68 (BP Location: Left arm, Patient Position: Sitting)   Pulse 75   Ht 1.778 m (5' 10\")   Wt 78 kg (172 lb)   SpO2 97%   BMI 24.68 kg/m²   Smoking Status Never   BSA 1.96 m²      LASTWT(3):   Wt Readings from Last 3 Encounters:   07/11/25 78 kg (172 lb)   07/09/25 77.6 kg (171 lb)   06/27/25 80 kg (176 lb 5.9 oz)       Physical Exam:  In general: alert and in no acute distress.   HEENT: Carotid upstrokes normal with no bruits. JVP is normal.   Pulmonary: Clear to auscultation bilaterally.  Cardiovascular: S1,S2, regular. No appreciable murmurs, rubs or gallops.   Lower extremities: Warm. 2+ distal pulses. No edema.     Last Labs:  CBC -  Recent Labs     06/27/25  0540 06/26/25  0341 06/25/25  1401   WBC 7.7 10.3 6.4   HGB 12.4* 13.7 13.8   HCT 38.3* 40.5* 44.2    213 180   MCV 93 91 97       CMP -  Recent Labs     06/27/25  0540 06/26/25  0341 06/25/25  1401    133* 134*   K 3.7 4.1 4.3    101 105   CO2 24 21 21   ANIONGAP 12 15 12   BUN 20 18 14   CREATININE 0.88 0.93 0.88   EGFR 87 83 87   MG 2.10 1.90 2.20     Recent Labs     06/27/25  0540 06/26/25  0341 06/25/25  1401   ALBUMIN 3.6 3.9 3.7   ALKPHOS 63 64 61   ALT 12 11 8*   AST 39 36 22   BILITOT 1.8* 1.5* 1.0       LIPID PANEL -   Recent Labs     06/26/25  0341 12/20/24  1145 12/18/23  1031 04/11/23  1045 04/13/22  0946 12/11/21  1100   CHOL 211* 183 177 173 184 208*   LDLCALC 151* 121* 125*  --   --   --    LDLF  --   --   --  118* 126* 150*   HDL 44.8 47.2 37.3 42.4 43.0 36.0*   TRIG 77 73 76 64 73 109       Recent Labs     06/26/25  0341 12/18/23  1031 11/28/23  0329 09/05/21  0612 09/04/21  1851   BNP  --   --   --   --  170*   HGBA1C 5.1 5.3 5.5   < >  --     < > = values in this interval not displayed.           Assessment/Plan   1) CAD: PCI to RCA 2021 and noted LAD occlusion in the past.  PCI to RCA 6/25/2025 complicated by postprocedural stroke.    Although he is still " experiencing some dyspnea on exertion he has not been exerting himself much.  I would like to see how he improves with initiation of cardiac rehabilitation.    In the meantime he is instructed to continue with aspirin 81 mg daily for 1 month, clopidogrel 75 mg daily for 3 months, and continue Xarelto.    2) permanent atrial fibrillation: On anticoagulation with Xarelto.  Rates are controlled without the need of beta-blockers at this time.    3) dyslipidemia: LDL not at goal.  Significant statin induced myalgias with rosuvastatin, atorvastatin, pravastatin, and possibly ezetimibe.  Given his myasthenia gravis I am concerned about using any of the injectable PCSK9 inhibitors.      I would like to try 1 more time with ezetimibe 10 mg once daily.  If he has side effects with this we will stop it.  I will also call in a prescription for Nexletol and asked the clinical pharmacy team to assist with prior approval.  Would not repeat lipid panel for 2 to 3 months after being on medication.    4) hypertension: Blood pressure controlled.  Continue amlodipine 5 mg daily.    5) follow-up: 2 months with Tracy Schwab and then 4 months with me.      Eduardo Alegre MD

## 2025-07-11 ENCOUNTER — OFFICE VISIT (OUTPATIENT)
Dept: CARDIOLOGY | Facility: CLINIC | Age: 80
End: 2025-07-11
Payer: MEDICARE

## 2025-07-11 ENCOUNTER — PATIENT OUTREACH (OUTPATIENT)
Dept: PRIMARY CARE | Facility: CLINIC | Age: 80
End: 2025-07-11

## 2025-07-11 VITALS
WEIGHT: 172 LBS | BODY MASS INDEX: 24.62 KG/M2 | SYSTOLIC BLOOD PRESSURE: 118 MMHG | HEART RATE: 75 BPM | DIASTOLIC BLOOD PRESSURE: 68 MMHG | HEIGHT: 70 IN | OXYGEN SATURATION: 97 %

## 2025-07-11 DIAGNOSIS — I48.21 PERMANENT ATRIAL FIBRILLATION (MULTI): ICD-10-CM

## 2025-07-11 DIAGNOSIS — I25.10 CAD S/P PERCUTANEOUS CORONARY ANGIOPLASTY: Primary | ICD-10-CM

## 2025-07-11 DIAGNOSIS — Z98.61 CAD S/P PERCUTANEOUS CORONARY ANGIOPLASTY: Primary | ICD-10-CM

## 2025-07-11 DIAGNOSIS — I10 BENIGN ESSENTIAL HYPERTENSION: ICD-10-CM

## 2025-07-11 DIAGNOSIS — E78.5 DYSLIPIDEMIA: ICD-10-CM

## 2025-07-11 PROCEDURE — 99214 OFFICE O/P EST MOD 30 MIN: CPT | Performed by: INTERNAL MEDICINE

## 2025-07-11 PROCEDURE — 3074F SYST BP LT 130 MM HG: CPT | Performed by: INTERNAL MEDICINE

## 2025-07-11 PROCEDURE — 1160F RVW MEDS BY RX/DR IN RCRD: CPT | Performed by: INTERNAL MEDICINE

## 2025-07-11 PROCEDURE — 99212 OFFICE O/P EST SF 10 MIN: CPT

## 2025-07-11 PROCEDURE — 3078F DIAST BP <80 MM HG: CPT | Performed by: INTERNAL MEDICINE

## 2025-07-11 PROCEDURE — 93005 ELECTROCARDIOGRAM TRACING: CPT | Performed by: INTERNAL MEDICINE

## 2025-07-11 PROCEDURE — 93010 ELECTROCARDIOGRAM REPORT: CPT | Performed by: INTERNAL MEDICINE

## 2025-07-11 PROCEDURE — 1159F MED LIST DOCD IN RCRD: CPT | Performed by: INTERNAL MEDICINE

## 2025-07-11 PROCEDURE — 1036F TOBACCO NON-USER: CPT | Performed by: INTERNAL MEDICINE

## 2025-07-11 PROCEDURE — 1111F DSCHRG MED/CURRENT MED MERGE: CPT | Performed by: INTERNAL MEDICINE

## 2025-07-11 RX ORDER — BEMPEDOIC ACID 180 MG/1
180 TABLET, FILM COATED ORAL NIGHTLY
Qty: 30 TABLET | Refills: 11 | Status: SHIPPED | OUTPATIENT
Start: 2025-07-11 | End: 2026-07-11

## 2025-07-11 RX ORDER — EZETIMIBE 10 MG/1
10 TABLET ORAL DAILY
Qty: 30 TABLET | Refills: 11 | Status: SHIPPED | OUTPATIENT
Start: 2025-07-11 | End: 2026-07-11

## 2025-07-11 NOTE — PATIENT INSTRUCTIONS
1) Start ezetimibe 10 mg once a day for your cholesterol. This is not a statin    2) If you have any side effects from this medicine call us    3) We will try to get another cholesterol medication approved. This is called Nexletol and again is not a statin. I will call it in but do not pick it up until you hear that it was approved.     4) Because of your coronary artery disease we want to be aggressive with lowering your cholesterol and I believe we will need both of these medications to keep your cholesterol in check

## 2025-08-06 ENCOUNTER — APPOINTMENT (OUTPATIENT)
Dept: PRIMARY CARE | Facility: CLINIC | Age: 80
End: 2025-08-06
Payer: MEDICARE

## 2025-08-14 ENCOUNTER — CLINICAL SUPPORT (OUTPATIENT)
Dept: PRIMARY CARE | Facility: CLINIC | Age: 80
End: 2025-08-14
Payer: MEDICARE

## 2025-08-14 ENCOUNTER — PATIENT OUTREACH (OUTPATIENT)
Dept: PRIMARY CARE | Facility: CLINIC | Age: 80
End: 2025-08-14
Payer: MEDICARE

## 2025-08-14 DIAGNOSIS — E53.8 LOW SERUM VITAMIN B12: Primary | ICD-10-CM

## 2025-08-14 PROCEDURE — 96372 THER/PROPH/DIAG INJ SC/IM: CPT | Performed by: INTERNAL MEDICINE

## 2025-08-14 RX ORDER — CYANOCOBALAMIN 1000 UG/ML
1000 INJECTION, SOLUTION INTRAMUSCULAR; SUBCUTANEOUS ONCE
Status: COMPLETED | OUTPATIENT
Start: 2025-08-14 | End: 2025-08-14

## 2025-08-14 RX ADMIN — CYANOCOBALAMIN 1000 MCG: 1000 INJECTION, SOLUTION INTRAMUSCULAR; SUBCUTANEOUS at 15:29

## 2025-09-11 ENCOUNTER — APPOINTMENT (OUTPATIENT)
Dept: CARDIOLOGY | Facility: CLINIC | Age: 80
End: 2025-09-11
Payer: MEDICARE

## 2025-09-19 ENCOUNTER — APPOINTMENT (OUTPATIENT)
Dept: PRIMARY CARE | Facility: CLINIC | Age: 80
End: 2025-09-19
Payer: MEDICARE

## 2025-10-03 ENCOUNTER — APPOINTMENT (OUTPATIENT)
Dept: PRIMARY CARE | Facility: CLINIC | Age: 80
End: 2025-10-03
Payer: MEDICARE

## 2025-12-04 ENCOUNTER — APPOINTMENT (OUTPATIENT)
Dept: PRIMARY CARE | Facility: CLINIC | Age: 80
End: 2025-12-04
Payer: MEDICARE

## (undated) DEVICE — MBRACE, WRIST SUPPORT WITH HOOK

## (undated) DEVICE — Device

## (undated) DEVICE — DRAPE PACK, LAVH, W/ATTACHED LEGGINGS, W/POUCH, 100 X 114 IN, LF, STERILE

## (undated) DEVICE — SHEATH, GLIDESHEATH, SLENDER, 6FR 10CM

## (undated) DEVICE — TOWEL PACK, STERILE, 4/PACK, BLUE

## (undated) DEVICE — GUIDEWIRE, RUN THROUGH WIRE, 180CM

## (undated) DEVICE — GUIDEWIRE, .035 X 150 ANGLED 3CM

## (undated) DEVICE — GLOVE, SURGICAL, PROTEXIS PI ORTHO, 7.0, PF, LF

## (undated) DEVICE — IRRIGATION KIT, PUMPING, SINGLE ACTION, SYRINGE, 10 CC

## (undated) DEVICE — NEEDLE, HYPODERMIC, SPECIALTY, REGULAR WALL, SHORT BEVEL, 18 G X 1.5 IN

## (undated) DEVICE — INFLATION DEVICE, BASIXCOMPAX, 30 ATM/BAR, 20ML, MAP152, 12IN TUBING

## (undated) DEVICE — CATHETER, BALLOON DILATION, EUPHORA SEMICOMPLIANT 2.50  X 20 MM X 142CM

## (undated) DEVICE — CATHETER, BALLOON, NC EUPHORA NONCOMPLIANT 4.0 X 15 X 142CM

## (undated) DEVICE — ELECTRODE, QUICK-COMBO, REDI PACK

## (undated) DEVICE — SOLUTION, IRRIGATION, SODIUM CHLORIDE 0.9%, 1000 ML, POUR BOTTLE

## (undated) DEVICE — BASKET, STONE, RETRIEVAL, NITINOL (4WIRE, 1.9 0 TIP)

## (undated) DEVICE — CATHETER, DRAGONFLY, OPSTAR

## (undated) DEVICE — TR BAND, RADIAL COMPRESSION, STANDARD, 24CM

## (undated) DEVICE — MANIFOLD KIT, CUSTOM (SJM)

## (undated) DEVICE — GUIDEWIRE, AMPLATZ, SUPER STIFF, 035 STRAIGHT

## (undated) DEVICE — GLOVE, SURGICAL, PROTEXIS PI W/NEU-THERA, 7.5, PF, LF

## (undated) DEVICE — CATHETER, GUIDING, HEARTRAIL III, 6 FR IKARI RIGHT 1.0

## (undated) DEVICE — Y-ADAPTER, GATEWAY ADVANTAGE

## (undated) DEVICE — LASER FIBER, HOLMIUM MP 365

## (undated) DEVICE — SOLUTION, IRRIGATION, STERILE WATER, 1000 ML, POUR BOTTLE

## (undated) DEVICE — CATHETER, URETERAL, POLLACK, OPEN END, 5.5 FR, 70 CM

## (undated) DEVICE — CATHETER, OPTITORQUE, 5FR, JACKY, 3.5/ 2H/110CM, CURVED

## (undated) DEVICE — SYRINGE, LUER LOCK, 12ML